# Patient Record
Sex: FEMALE | Race: WHITE | NOT HISPANIC OR LATINO | Employment: PART TIME | URBAN - METROPOLITAN AREA
[De-identification: names, ages, dates, MRNs, and addresses within clinical notes are randomized per-mention and may not be internally consistent; named-entity substitution may affect disease eponyms.]

---

## 2017-01-06 ENCOUNTER — OFFICE VISIT (OUTPATIENT)
Dept: OBSTETRICS AND GYNECOLOGY | Facility: CLINIC | Age: 22
End: 2017-01-06
Payer: COMMERCIAL

## 2017-01-06 VITALS
WEIGHT: 157 LBS | BODY MASS INDEX: 25.23 KG/M2 | DIASTOLIC BLOOD PRESSURE: 82 MMHG | SYSTOLIC BLOOD PRESSURE: 122 MMHG | HEIGHT: 66 IN

## 2017-01-06 DIAGNOSIS — Z30.41 ENCOUNTER FOR SURVEILLANCE OF CONTRACEPTIVE PILLS: ICD-10-CM

## 2017-01-06 DIAGNOSIS — Z01.419 ENCOUNTER FOR GYNECOLOGICAL EXAMINATION WITHOUT ABNORMAL FINDING: Primary | ICD-10-CM

## 2017-01-06 DIAGNOSIS — Z30.40 ENCOUNTER FOR SURVEILLANCE OF CONTRACEPTIVES: ICD-10-CM

## 2017-01-06 PROCEDURE — 99395 PREV VISIT EST AGE 18-39: CPT | Mod: S$GLB,,, | Performed by: NURSE PRACTITIONER

## 2017-01-06 PROCEDURE — 99999 PR PBB SHADOW E&M-EST. PATIENT-LVL II: CPT | Mod: PBBFAC,,, | Performed by: NURSE PRACTITIONER

## 2017-01-06 RX ORDER — NORETHINDRONE ACETATE AND ETHINYL ESTRADIOL 1.5-30(21)
1 KIT ORAL DAILY
Qty: 28 TABLET | Refills: 12 | Status: SHIPPED | OUTPATIENT
Start: 2017-01-06 | End: 2018-01-15

## 2017-01-06 NOTE — PROGRESS NOTES
"CC: Well woman exam    Evan Sousa is a 21 y.o. female  presents for well woman exam.  LMP: Patient's last menstrual period was 2016 (exact date)..  No issues, problems, or complaints.      History reviewed. No pertinent past medical history.  History reviewed. No pertinent past surgical history.  Social History     Social History    Marital status: Single     Spouse name: N/A    Number of children: N/A    Years of education: N/A     Occupational History    Not on file.     Social History Main Topics    Smoking status: Never Smoker    Smokeless tobacco: Not on file    Alcohol use 0.0 oz/week     0 Standard drinks or equivalent per week      Comment: socially    Drug use: No    Sexual activity: Yes     Partners: Male     Birth control/ protection: Condom, OCP     Other Topics Concern    Not on file     Social History Narrative     Family History   Problem Relation Age of Onset    Breast cancer Maternal Grandmother     Diabetes Paternal Grandfather     Stroke Paternal Grandfather     Ovarian cancer Neg Hx     Deep vein thrombosis Neg Hx     Cancer Neg Hx     Colon cancer Neg Hx     Eclampsia Neg Hx     Hypertension Neg Hx     Miscarriages / Stillbirths Neg Hx      labor Neg Hx      OB History      Para Term  AB TAB SAB Ectopic Multiple Living    0 0 0 0 0 0 0 0 0 0          Visit Vitals    /82    Ht 5' 6" (1.676 m)    Wt 71.2 kg (157 lb)    LMP 2016 (Exact Date)    BMI 25.34 kg/m2         ROS:  GENERAL: Denies weight gain or weight loss. Feeling well overall.   SKIN: Denies rash or lesions.   HEAD: Denies head injury or headache.   NODES: Denies enlarged lymph nodes.   CHEST: Denies chest pain or shortness of breath.   CARDIOVASCULAR: Denies palpitations or left sided chest pain.   ABDOMEN: No abdominal pain, constipation, diarrhea, nausea, vomiting or rectal bleeding.   URINARY: No frequency, dysuria, hematuria, or burning on " urination.  REPRODUCTIVE: See HPI.   BREASTS: The patient performs breast self-examination and denies pain, lumps, or nipple discharge.   HEMATOLOGIC: No easy bruisability or excessive bleeding.   MUSCULOSKELETAL: Denies joint pain or swelling.   NEUROLOGIC: Denies syncope or weakness.   PSYCHIATRIC: Denies depression, anxiety or mood swings.    PHYSICAL EXAM:  APPEARANCE: Well nourished, well developed, in no acute distress.  AFFECT: WNL, alert and oriented x 3  SKIN: No acne or hirsutism  NECK: Neck symmetric without masses or thyromegaly  NODES: No inguinal, cervical, axillary, or femoral lymph node enlargement  CHEST: Good respiratory effect  ABDOMEN: Soft.  No tenderness or masses.  No hepatosplenomegaly.  No hernias.  BREASTS: Symmetrical, no skin changes or visible lesions.  No palpable masses, nipple discharge bilaterally.  PELVIC: Normal external genitalia without lesions.  Normal hair distribution.  Adequate perineal body, normal urethral meatus.  Vagina moist and well rugated without lesions or discharge.  Cervix pink, without lesions, discharge or tenderness.  No significant cystocele or rectocele.  Bimanual exam shows uterus to be normal size, regular, mobile and nontender.  Adnexa without masses or tenderness.    EXTREMITIES: No edema.  Physical Exam    1. Encounter for gynecological examination without abnormal finding     2. Encounter for surveillance of contraceptive pills     3. Encounter for surveillance of contraceptives  norethindrone-ethinyl estradiol-iron (GILDESS FE 1.5/30, 28,) 1.5 mg-30 mcg (21)/75 mg (7) tablet    AND PLAN:    Patient was counseled today on A.C.S. Pap guidelines and recommendations for yearly pelvic exams, mammograms and monthly self breast exams; to see her PCP for other health maintenance.

## 2017-01-09 ENCOUNTER — OFFICE VISIT (OUTPATIENT)
Dept: INTERNAL MEDICINE | Facility: CLINIC | Age: 22
End: 2017-01-09
Payer: COMMERCIAL

## 2017-01-09 ENCOUNTER — LAB VISIT (OUTPATIENT)
Dept: LAB | Facility: HOSPITAL | Age: 22
End: 2017-01-09
Attending: NURSE PRACTITIONER
Payer: COMMERCIAL

## 2017-01-09 VITALS
DIASTOLIC BLOOD PRESSURE: 86 MMHG | HEART RATE: 59 BPM | BODY MASS INDEX: 25.19 KG/M2 | SYSTOLIC BLOOD PRESSURE: 118 MMHG | TEMPERATURE: 98 F | HEIGHT: 66 IN | OXYGEN SATURATION: 99 % | WEIGHT: 156.75 LBS

## 2017-01-09 DIAGNOSIS — F41.9 ANXIETY: ICD-10-CM

## 2017-01-09 DIAGNOSIS — Z00.00 PHYSICAL EXAM: Primary | ICD-10-CM

## 2017-01-09 DIAGNOSIS — F32.89 OTHER DEPRESSION: ICD-10-CM

## 2017-01-09 DIAGNOSIS — Z00.00 PHYSICAL EXAM: ICD-10-CM

## 2017-01-09 DIAGNOSIS — R53.83 FATIGUE, UNSPECIFIED TYPE: ICD-10-CM

## 2017-01-09 LAB
ANION GAP SERPL CALC-SCNC: 10 MMOL/L
BASOPHILS # BLD AUTO: 0.04 K/UL
BASOPHILS NFR BLD: 0.4 %
BUN SERPL-MCNC: 10 MG/DL
CALCIUM SERPL-MCNC: 9.6 MG/DL
CHLORIDE SERPL-SCNC: 104 MMOL/L
CO2 SERPL-SCNC: 23 MMOL/L
CREAT SERPL-MCNC: 0.7 MG/DL
DIFFERENTIAL METHOD: ABNORMAL
EOSINOPHIL # BLD AUTO: 0.3 K/UL
EOSINOPHIL NFR BLD: 3.3 %
ERYTHROCYTE [DISTWIDTH] IN BLOOD BY AUTOMATED COUNT: 12.4 %
EST. GFR  (AFRICAN AMERICAN): >60 ML/MIN/1.73 M^2
EST. GFR  (NON AFRICAN AMERICAN): >60 ML/MIN/1.73 M^2
GLUCOSE SERPL-MCNC: 81 MG/DL
HCT VFR BLD AUTO: 41.1 %
HGB BLD-MCNC: 14 G/DL
LYMPHOCYTES # BLD AUTO: 2.8 K/UL
LYMPHOCYTES NFR BLD: 30.3 %
MCH RBC QN AUTO: 29.5 PG
MCHC RBC AUTO-ENTMCNC: 34.1 %
MCV RBC AUTO: 87 FL
MONOCYTES # BLD AUTO: 0.6 K/UL
MONOCYTES NFR BLD: 6.8 %
NEUTROPHILS # BLD AUTO: 5.5 K/UL
NEUTROPHILS NFR BLD: 59 %
PLATELET # BLD AUTO: 387 K/UL
PMV BLD AUTO: 10.5 FL
POTASSIUM SERPL-SCNC: 4 MMOL/L
RBC # BLD AUTO: 4.75 M/UL
SODIUM SERPL-SCNC: 137 MMOL/L
TSH SERPL DL<=0.005 MIU/L-ACNC: 1.02 UIU/ML
WBC # BLD AUTO: 9.32 K/UL

## 2017-01-09 PROCEDURE — 84443 ASSAY THYROID STIM HORMONE: CPT

## 2017-01-09 PROCEDURE — 36415 COLL VENOUS BLD VENIPUNCTURE: CPT | Mod: PO

## 2017-01-09 PROCEDURE — 80048 BASIC METABOLIC PNL TOTAL CA: CPT

## 2017-01-09 PROCEDURE — 1159F MED LIST DOCD IN RCRD: CPT | Mod: S$GLB,,, | Performed by: NURSE PRACTITIONER

## 2017-01-09 PROCEDURE — 99395 PREV VISIT EST AGE 18-39: CPT | Mod: S$GLB,,, | Performed by: NURSE PRACTITIONER

## 2017-01-09 PROCEDURE — 99999 PR PBB SHADOW E&M-EST. PATIENT-LVL III: CPT | Mod: PBBFAC,,, | Performed by: NURSE PRACTITIONER

## 2017-01-09 PROCEDURE — 85025 COMPLETE CBC W/AUTO DIFF WBC: CPT

## 2017-01-09 NOTE — PROGRESS NOTES
"Subjective:   Patient ID: Evan Sousa is a 21 y.o. female.    Chief Complaint: Annual Exam    HPI Comments: Pt. Presents today for an routine physical exam as her therapist told her to come have some basic labs done. She has been seeing a therapist since March of 2016 for problems with anxiety and depression. States she had this "problem a few years ago" and then felt better. She reports not sure exactly why she feels this way as she has a good relationship with her significant other and has a good job. However, her friends don't live around here and has some family problems. Symptoms of low energy, fatigue, and insomnia. Reports that she cries at least once a week. No homicidal or suicidal ideation. No visual or auditory hallucinations. Has not taken any medications for depression/anxiety in the past. She feels like the therapist sessions help so she can talk to someone    Review of Systems   Constitutional: Negative for appetite change, fever and unexpected weight change.   Eyes: Negative for visual disturbance.   Respiratory: Negative for cough and shortness of breath.    Cardiovascular: Negative for chest pain and palpitations.   Gastrointestinal: Negative for abdominal pain, diarrhea, nausea and vomiting.   Genitourinary: Negative for difficulty urinating.   Neurological: Negative for dizziness, weakness and headaches.   Psychiatric/Behavioral: Positive for dysphoric mood and sleep disturbance. Negative for agitation, confusion, hallucinations, self-injury and suicidal ideas. The patient is nervous/anxious. The patient is not hyperactive.        Objective:      Physical Exam   Constitutional: She is oriented to person, place, and time. She appears well-developed and well-nourished. No distress.   HENT:   Head: Normocephalic and atraumatic.   Eyes: Lids are normal.   Neck: Neck supple. No thyromegaly present.   Cardiovascular: Normal rate, regular rhythm and normal heart sounds.    Pulmonary/Chest: " Effort normal and breath sounds normal.   Abdominal: Soft. Bowel sounds are normal. She exhibits no distension. There is no tenderness.   Musculoskeletal: Normal range of motion.   Lymphadenopathy:     She has no cervical adenopathy.   Neurological: She is alert and oriented to person, place, and time.   Skin: Skin is warm and dry. She is not diaphoretic.   Psychiatric: She has a normal mood and affect. Her speech is normal and behavior is normal. Judgment and thought content normal.   Nursing note and vitals reviewed.      Assessment:       1. Physical exam    2. Other depression    3. Anxiety    4. Fatigue, unspecified type        Plan:   Physical exam  Fatigue, unspecified type  -     CBC auto differential; Future; Expected date: 1/9/17  -     Basic metabolic panel; Future; Expected date: 7/8/17  -     TSH; Future; Expected date: 4/9/17    Other depression  Anxiety       - Continue care with the therapist.        -  Discussed starting medication - will await labs then f/u to establish care with an MD to discuss      Return in about 1 week (around 1/16/2017).

## 2017-01-09 NOTE — MR AVS SNAPSHOT
[facility]  [address] [place] [address]  Phone: [phone]  Fax: [phone]                  Evan Sousa   2017 3:20 PM   Office Visit    Description:  Female : 1995   Provider:  TAYLOR Castro   Department:  [facility]           Reason for Visit     Annual Exam           Diagnoses this Visit        Comments    Physical exam    -  Primary     Other depression         Anxiety         Fatigue, unspecified type                To Do List           Future Appointments        Provider Department Dept Phone    2017 3:50 PM LABORATORY, [facility] [phone]    2017 1:40 PM Haider Ochoa MD [facility] [phone]      Goals (5 Years of Data)     None      Follow-Up and Disposition     Return in about 1 week (around 2017).      [facility]     [facility] Nurse Care Line -  Assistance  Registered nurses in the [facility] Center provide clinical advisement, health education, appointment booking, and other advisory services.  Call for this free service at [phone].             Medications           Message regarding Medications     Verify the changes and/or additions to your medication regime listed below are the same as discussed with your clinician today.  If any of these changes or additions are incorrect, please notify your healthcare provider.             Verify that the below list of medications is an accurate representation of the medications you are currently taking.  If none reported, the list may be blank. If incorrect, please contact your healthcare provider. Carry this list with you in case of emergency.           Current Medications     norethindrone-ethinyl estradiol-iron (GILDESS FE 1.5/30, 28,) 1.5 mg-30 mcg (21)/75 mg (7) tablet Take 1 tablet by mouth once daily.           Clinical Reference Information           Vital Signs - Last Recorded  Most  "recent update: 1/9/2017  3:28 PM by Lauren Doyle MA    BP Pulse Temp Ht Wt LMP    118/86 (!) 59 98.3 °F (36.8 °C) (Tympanic) 5' 6" (1.676 m) 71.1 kg (156 lb 12 oz) 12/21/2016 (Exact Date)    SpO2 BMI             99% 25.3 kg/m2         Blood Pressure          Most Recent Value    BP  118/86      Allergies as of 1/9/2017     No Known Allergies      Immunizations Administered on Date of Encounter - 1/9/2017     None      Orders Placed During Today's Visit     Future Labs/Procedures Expected by Expires    CBC auto differential  1/9/2017 3/10/2018    TSH  4/9/2017 1/9/2018    Basic metabolic panel  7/8/2017 1/9/2018      "

## 2017-01-23 ENCOUNTER — OFFICE VISIT (OUTPATIENT)
Dept: INTERNAL MEDICINE | Facility: CLINIC | Age: 22
End: 2017-01-23
Payer: COMMERCIAL

## 2017-01-23 VITALS
DIASTOLIC BLOOD PRESSURE: 84 MMHG | HEIGHT: 66 IN | SYSTOLIC BLOOD PRESSURE: 118 MMHG | OXYGEN SATURATION: 99 % | BODY MASS INDEX: 25.05 KG/M2 | WEIGHT: 155.88 LBS | TEMPERATURE: 98 F | HEART RATE: 75 BPM

## 2017-01-23 DIAGNOSIS — F32.A DEPRESSION, UNSPECIFIED DEPRESSION TYPE: ICD-10-CM

## 2017-01-23 DIAGNOSIS — F41.9 ANXIETY: Primary | ICD-10-CM

## 2017-01-23 PROCEDURE — 99999 PR PBB SHADOW E&M-EST. PATIENT-LVL III: CPT | Mod: PBBFAC,,, | Performed by: INTERNAL MEDICINE

## 2017-01-23 PROCEDURE — 99213 OFFICE O/P EST LOW 20 MIN: CPT | Mod: S$GLB,,, | Performed by: INTERNAL MEDICINE

## 2017-01-23 PROCEDURE — 1159F MED LIST DOCD IN RCRD: CPT | Mod: S$GLB,,, | Performed by: INTERNAL MEDICINE

## 2017-01-23 RX ORDER — ESCITALOPRAM OXALATE 10 MG/1
10 TABLET ORAL DAILY
Qty: 30 TABLET | Refills: 1 | Status: SHIPPED | OUTPATIENT
Start: 2017-01-23 | End: 2017-07-27

## 2017-01-23 NOTE — MR AVS SNAPSHOT
[facility]  [address]  [place] [address]  Phone: [phone]  Fax: [phone]                  Evan Sousa   2017 3:20 PM   Office Visit    Description:  Female : 1995   Provider:  Haider Ochoa MD   Department:  [facility]           Reason for Visit     Establish Care           Diagnoses this Visit        Comments    Anxiety    -  Primary     Depression, unspecified depression type                To Do List           Future Appointments        Provider Department Dept Phone    2017 3:20 PM Haider Ochoa MD [facility] [phone]      Goals (5 Years of Data)     None      Follow-Up and Disposition     Return in about 4 weeks (around 2017), or if symptoms worsen or fail to improve.       These Medications        Disp Refills Start End    escitalopram oxalate (LEXAPRO) 10 MG tablet 30 tablet 1 2017    Take 1 tablet (10 mg total) by mouth once daily. - Oral    Pharmacy: [facility]/pharmacy #[id number] - [address] #: [phone]         [facility] On Call     [facility] On Call Nurse Care Line -  Assistance  Registered nurses in the [facility] On Call Center provide clinical advisement, health education, appointment booking, and other advisory services.  Call for this free service at [phone].             Medications           Message regarding Medications     Verify the changes and/or additions to your medication regime listed below are the same as discussed with your clinician today.  If any of these changes or additions are incorrect, please notify your healthcare provider.        START taking these NEW medications        Refills    escitalopram oxalate (LEXAPRO) 10 MG tablet 1    Sig: Take 1 tablet (10 mg total) by mouth once daily.    Class: Normal    Route: Oral           Verify that the below list of medications is an accurate representation of the  "medications you are currently taking.  If none reported, the list may be blank. If incorrect, please contact your healthcare provider. Carry this list with you in case of emergency.           Current Medications     norethindrone-ethinyl estradiol-iron (GILDESS FE 1.5/30, 28,) 1.5 mg-30 mcg (21)/75 mg (7) tablet Take 1 tablet by mouth once daily.    escitalopram oxalate (LEXAPRO) 10 MG tablet Take 1 tablet (10 mg total) by mouth once daily.           Clinical Reference Information           Vital Signs - Last Recorded  Most recent update: 1/23/2017  3:46 PM by Lindsay Cobos MA    BP Pulse Temp Ht Wt LMP    118/84 (BP Location: Right arm, Patient Position: Sitting) 75 98.4 °F (36.9 °C) (Tympanic) 5' 6" (1.676 m) 70.7 kg (155 lb 13.8 oz) 01/18/2017 (Exact Date)    SpO2 BMI             99% 25.16 kg/m2         Blood Pressure          Most Recent Value    BP  118/84      Allergies as of 1/23/2017     No Known Allergies      Immunizations Administered on Date of Encounter - 1/23/2017     None      "

## 2017-01-23 NOTE — LETTER
January 28, 2017      Alexander Tay MD  [address] [place] [address]           [facility]  [address]  [place] [address]  Phone: [phone]  Fax: [phone]          Patient: Evan Sousa   MR Number: [id number]   YOB: 1995   Date of Visit: 1/23/2017       Dear Dr. Alexander Tay:    Thank you for referring Evan Sousa to me for evaluation. Attached you will find relevant portions of my assessment and plan of care.    If you have questions, please do not hesitate to call me. I look forward to following Evan Sousa along with you.    Sincerely,    Haider Ochoa MD    Enclosure  CC:  No Recipients    If you would like to receive this communication electronically, please contact [email or web address] or [phone] to request more information on [employer] Link access.    For providers and/or their staff who would like to refer a patient to [facility], please contact us through our one-stop-shop provider referral line, [facility] [place], at [phone].    If you feel you have received this communication in error or would no longer like to receive these types of communications, please e-mail [email or web address]

## 2017-01-23 NOTE — PROGRESS NOTES
"Subjective:      Patient ID: Evan Sousa is a 21 y.o. female.    Chief Complaint: Establish Care    HPI Comments:  She has been seeing a therapist since March of 2016 for problems with anxiety and depression. States she had this "problem a few years ago" and then felt better. She reports not sure exactly why she feels sad and anxious.  However, her friends don't live around here and has some family problems. Symptoms of low energy, fatigue, and insomnia, decreased volition, anhedonia.  Reports that she cries at least once a week. No homicidal or suicidal ideation. No visual or auditory hallucinations. Has not taken any medications for depression/anxiety in the past. She feels like the therapist sessions helped but interested in meds.     Review of Systems   Constitutional: Negative for chills and fever.   Respiratory: Negative for cough.    Cardiovascular: Negative for chest pain.   Gastrointestinal: Negative for abdominal pain.   Psychiatric/Behavioral: Positive for dysphoric mood. Negative for hallucinations and suicidal ideas. The patient is nervous/anxious.      Objective:     Visit Vitals    /84 (BP Location: Right arm, Patient Position: Sitting)    Pulse 75    Temp 98.4 °F (36.9 °C) (Tympanic)    Ht 5' 6" (1.676 m)    Wt 70.7 kg (155 lb 13.8 oz)    LMP 01/18/2017 (Exact Date)    SpO2 99%    BMI 25.16 kg/m2       Physical Exam   Constitutional: She appears well-developed and well-nourished. No distress.   Cardiovascular: Normal rate.    Pulmonary/Chest: Effort normal.   Neurological: She is alert.   Skin: Skin is warm and dry.   Psychiatric: She has a normal mood and affect. Her behavior is normal.       Assessment:     1. Anxiety    2. Depression, unspecified depression type      Plan:   Anxiety  -     escitalopram oxalate (LEXAPRO) 10 MG tablet; Take 1 tablet (10 mg total) by mouth once daily.  Dispense: 30 tablet; Refill: 1    Depression, unspecified depression type  -     escitalopram " oxalate (LEXAPRO) 10 MG tablet; Take 1 tablet (10 mg total) by mouth once daily.  Dispense: 30 tablet; Refill: 1              Return in about 4 weeks (around 2/20/2017), or if symptoms worsen or fail to improve.

## 2017-07-27 ENCOUNTER — OFFICE VISIT (OUTPATIENT)
Dept: INTERNAL MEDICINE | Facility: CLINIC | Age: 22
End: 2017-07-27
Payer: COMMERCIAL

## 2017-07-27 VITALS
DIASTOLIC BLOOD PRESSURE: 80 MMHG | SYSTOLIC BLOOD PRESSURE: 118 MMHG | WEIGHT: 156.75 LBS | OXYGEN SATURATION: 99 % | HEIGHT: 66 IN | TEMPERATURE: 98 F | BODY MASS INDEX: 25.19 KG/M2 | HEART RATE: 83 BPM

## 2017-07-27 DIAGNOSIS — Z00.00 ROUTINE GENERAL MEDICAL EXAMINATION AT A HEALTH CARE FACILITY: Primary | ICD-10-CM

## 2017-07-27 DIAGNOSIS — Z02.0 SCHOOL PHYSICAL EXAM: ICD-10-CM

## 2017-07-27 PROCEDURE — 99999 PR PBB SHADOW E&M-EST. PATIENT-LVL III: CPT | Mod: PBBFAC,,, | Performed by: INTERNAL MEDICINE

## 2017-07-27 PROCEDURE — 99395 PREV VISIT EST AGE 18-39: CPT | Mod: S$GLB,,, | Performed by: INTERNAL MEDICINE

## 2017-07-27 NOTE — PROGRESS NOTES
"Subjective:      Patient ID: Evan Sousa is a 22 y.o. female.    Chief Complaint: Annual Exam (school forms completed)    23 yo with Patient Active Problem List:     Near syncope     Reactive hypoglycemia    History reviewed. No pertinent past medical history.    Here today for annual exam. Needs school forms completed for school. Non smoker. No first degree rel with breast or colon cancer.      Review of Systems   Constitutional: Negative for activity change and unexpected weight change.   HENT: Negative for hearing loss, rhinorrhea and trouble swallowing.    Eyes: Negative for discharge and visual disturbance.   Respiratory: Negative for chest tightness and wheezing.    Cardiovascular: Negative for chest pain and palpitations.   Gastrointestinal: Negative for blood in stool, constipation, diarrhea and vomiting.   Endocrine: Negative for polydipsia and polyuria.   Genitourinary: Negative for difficulty urinating, dysuria, hematuria and menstrual problem.   Musculoskeletal: Negative for arthralgias, joint swelling and neck pain.   Neurological: Negative for weakness and headaches.   Psychiatric/Behavioral: Negative for confusion and dysphoric mood.     Objective:   /80 (BP Location: Right arm, Patient Position: Sitting)   Pulse 83   Temp 98 °F (36.7 °C) (Tympanic)   Ht 5' 6" (1.676 m)   Wt 71.1 kg (156 lb 12 oz)   LMP 06/27/2017 (Approximate)   SpO2 99%   BMI 25.30 kg/m²     Physical Exam   Constitutional: She is oriented to person, place, and time. She appears well-developed and well-nourished. No distress.   HENT:   Head: Normocephalic and atraumatic.   Mouth/Throat: Oropharynx is clear and moist.   Eyes: EOM are normal. Pupils are equal, round, and reactive to light.   Neck: Neck supple. No thyromegaly present.   Cardiovascular: Normal rate and regular rhythm.    Pulmonary/Chest: Breath sounds normal. She has no wheezes. She has no rales.   Abdominal: Soft. Bowel sounds are normal. There is " no tenderness.   Musculoskeletal: Normal range of motion. She exhibits no edema or tenderness.        Cervical back: She exhibits normal range of motion, no tenderness, no bony tenderness and no swelling.        Lumbar back: She exhibits normal range of motion, no tenderness, no bony tenderness and no swelling.   Lymphadenopathy:     She has no cervical adenopathy.   Neurological: She is alert and oriented to person, place, and time. She has normal reflexes.   Skin: Skin is warm and dry. No rash noted.   Psychiatric: She has a normal mood and affect. Her behavior is normal.       Assessment:     1. Routine general medical examination at a health care facility    2. School physical exam      Plan:   Routine general medical examination at a health care facility  -     Lipid panel; Future; Expected date: 07/27/2017    School physical exam  -     Mumps, IgG Screen; Future; Expected date: 07/27/2017  -     Rubeola antibody IgG; Future; Expected date: 07/27/2017  -     RUBELLA ANTIBODY, IGG; Future; Expected date: 07/27/2017  -     HEPATITIS B SURFACE ANTIBODY; Future; Expected date: 07/27/2017  -     Varicella zoster antibody, IgG; Future; Expected date: 07/27/2017  -     POCT TB Skin Test Read    Heart healthy diet and reg exercise  HM reviewed with pt          Return if symptoms worsen or fail to improve.

## 2017-07-28 ENCOUNTER — CLINICAL SUPPORT (OUTPATIENT)
Dept: INTERNAL MEDICINE | Facility: CLINIC | Age: 22
End: 2017-07-28
Payer: COMMERCIAL

## 2017-07-28 ENCOUNTER — LAB VISIT (OUTPATIENT)
Dept: LAB | Facility: HOSPITAL | Age: 22
End: 2017-07-28
Attending: INTERNAL MEDICINE
Payer: COMMERCIAL

## 2017-07-28 DIAGNOSIS — Z00.00 ROUTINE GENERAL MEDICAL EXAMINATION AT A HEALTH CARE FACILITY: ICD-10-CM

## 2017-07-28 DIAGNOSIS — Z02.0 SCHOOL PHYSICAL EXAM: ICD-10-CM

## 2017-07-28 LAB
CHOLEST/HDLC SERPL: 4.2 {RATIO}
HDL/CHOLESTEROL RATIO: 23.9 %
HDLC SERPL-MCNC: 234 MG/DL
HDLC SERPL-MCNC: 56 MG/DL
LDLC SERPL CALC-MCNC: 138.6 MG/DL
NONHDLC SERPL-MCNC: 178 MG/DL
TRIGL SERPL-MCNC: 197 MG/DL

## 2017-07-28 PROCEDURE — 80061 LIPID PANEL: CPT

## 2017-07-28 PROCEDURE — 86735 MUMPS ANTIBODY: CPT

## 2017-07-28 PROCEDURE — 86580 TB INTRADERMAL TEST: CPT | Mod: S$GLB,,, | Performed by: INTERNAL MEDICINE

## 2017-07-28 PROCEDURE — 86765 RUBEOLA ANTIBODY: CPT

## 2017-07-28 PROCEDURE — 36415 COLL VENOUS BLD VENIPUNCTURE: CPT | Mod: PO

## 2017-07-28 PROCEDURE — 86706 HEP B SURFACE ANTIBODY: CPT

## 2017-07-28 PROCEDURE — 86762 RUBELLA ANTIBODY: CPT

## 2017-07-28 PROCEDURE — 86787 VARICELLA-ZOSTER ANTIBODY: CPT

## 2017-07-28 NOTE — PROGRESS NOTES
Patient here for PPD placement.  After verifying patient's allergies and medications, patient received tuberculin 0.1 mL to left forearm.  Instructed patient to return to clinic on 7/31/17 before 9:15 am for PPD reading.  Patient verbalized understanding.

## 2017-07-31 ENCOUNTER — CLINICAL SUPPORT (OUTPATIENT)
Dept: INTERNAL MEDICINE | Facility: CLINIC | Age: 22
End: 2017-07-31
Payer: COMMERCIAL

## 2017-07-31 LAB
HBV SURFACE AB SER-ACNC: NEGATIVE M[IU]/ML
MUMPS IGG INTERPRETATION: POSITIVE
MUMPS IGG SCREEN: 1.88 ISR
RUBEOLA IGG ANTIBODY: 2.24 ISR
RUBEOLA INTERPRETATION: POSITIVE
RUBV IGG SER-ACNC: 18.2 IU/ML
RUBV IGG SER-IMP: REACTIVE
TB INDURATION - 48 HR READ: NORMAL MM
TB INDURATION - 72 HR READ: 0 MM
TB SKIN TEST - 48 HR READ: NORMAL
TB SKIN TEST - 72 HR READ: NEGATIVE
VARICELLA INTERPRETATION: POSITIVE
VARICELLA ZOSTER IGG: 1.83 ISR

## 2017-07-31 NOTE — PROGRESS NOTES
Patient here for PPD reading. PPD negative with 0mm in diameter. Pt verbalized understanding. Nurse Antonia entered results in Links.

## 2017-08-01 ENCOUNTER — TELEPHONE (OUTPATIENT)
Dept: INTERNAL MEDICINE | Facility: CLINIC | Age: 22
End: 2017-08-01

## 2017-08-01 NOTE — TELEPHONE ENCOUNTER
----- Message from Daniel Lozano sent at 8/1/2017 10:20 AM CDT -----  Pt is returning a call to nurse in regards to lab results.          Please call pt back at [phone]

## 2017-09-01 ENCOUNTER — PATIENT MESSAGE (OUTPATIENT)
Dept: INTERNAL MEDICINE | Facility: CLINIC | Age: 22
End: 2017-09-01

## 2018-01-15 ENCOUNTER — OFFICE VISIT (OUTPATIENT)
Dept: OBSTETRICS AND GYNECOLOGY | Facility: CLINIC | Age: 23
End: 2018-01-15
Payer: COMMERCIAL

## 2018-01-15 VITALS
BODY MASS INDEX: 27.17 KG/M2 | SYSTOLIC BLOOD PRESSURE: 104 MMHG | HEIGHT: 66 IN | DIASTOLIC BLOOD PRESSURE: 66 MMHG | WEIGHT: 169.06 LBS

## 2018-01-15 DIAGNOSIS — N94.6 DYSMENORRHEA: ICD-10-CM

## 2018-01-15 DIAGNOSIS — Z01.419 WELL WOMAN EXAM WITH ROUTINE GYNECOLOGICAL EXAM: Primary | ICD-10-CM

## 2018-01-15 DIAGNOSIS — Z30.41 ENCOUNTER FOR SURVEILLANCE OF CONTRACEPTIVE PILLS: ICD-10-CM

## 2018-01-15 PROCEDURE — 99395 PREV VISIT EST AGE 18-39: CPT | Mod: S$GLB,,, | Performed by: NURSE PRACTITIONER

## 2018-01-15 PROCEDURE — 99999 PR PBB SHADOW E&M-EST. PATIENT-LVL III: CPT | Mod: PBBFAC,,, | Performed by: NURSE PRACTITIONER

## 2018-01-15 PROCEDURE — 88175 CYTOPATH C/V AUTO FLUID REDO: CPT

## 2018-01-15 RX ORDER — NAPROXEN SODIUM 550 MG/1
550 TABLET ORAL 2 TIMES DAILY WITH MEALS
Qty: 30 TABLET | Refills: 3 | Status: SHIPPED | OUTPATIENT
Start: 2018-01-15 | End: 2018-01-22

## 2018-01-15 RX ORDER — LEVONORGESTREL AND ETHINYL ESTRADIOL 0.15-0.03
1 KIT ORAL DAILY
Qty: 90 TABLET | Refills: 4 | Status: SHIPPED | OUTPATIENT
Start: 2018-01-15 | End: 2019-01-15

## 2018-01-15 NOTE — PROGRESS NOTES
HISTORY OF PRESENT ILLNESS:    Evan Sousa is a 22 y.o. female, , Patient's last menstrual period was 2017 (exact date).,  presents for a routine exam and OCP refills.   -Having cramping the first two days of her sugar pills and would like to change to the back to back pills.   -MGM  from breast cancer age 68 and did not get the genetic testing, however her sister is planning to get tested.     PAST HISTORY:  History reviewed. No pertinent past medical history.  History reviewed. No pertinent surgical history.    MEDICATIONS AND ALLERGIES:  Gilbert LÓPEZ    Review of patient's allergies indicates:  No Known Allergies    Family History   Problem Relation Age of Onset    Breast cancer Maternal Grandmother 70     Dec'd    Diabetes Paternal Grandfather     Stroke Paternal Grandfather     Ovarian cancer Neg Hx     Colon cancer Neg Hx        Social History     Social History    Marital status: Single     Spouse name: N/A    Number of children: N/A    Years of education: N/A     Occupational History    Not on file.     Social History Main Topics    Smoking status: Never Smoker    Smokeless tobacco: Never Used    Alcohol use 0.0 oz/week      Comment: socially    Drug use: No    Sexual activity: Yes     Partners: Male     Birth control/ protection: Condom, OCP     Other Topics Concern    Not on file     Social History Narrative    No narrative on file       OB HISTORY: None.     COMPREHENSIVE GYN HISTORY:  PAP History: Denies abnormal Paps. LAST PAP 8-14-15 NORMAL.  Infection History: Denies STDs. Denies PID.  Benign History: Denies uterine fibroids. Denies ovarian cysts. Denies endometriosis. Denies other conditions.  Cancer History: Denies cervical cancer. Denies uterine cancer or hyperplasia. Denies ovarian cancer. Denies vulvar cancer or pre-cancer. Denies vaginal cancer or pre-cancer. Denies breast cancer. Denies colon cancer.  Sexual Activity History: Reports currently being  "sexually active  Menstrual History: Monthly. Mod then light flow.   Dysmenorrhea History: Reports moderate dysmenorrhea.   Contraception: OCPs.    ROS:  GENERAL: No weight changes. No swelling. No fatigue. No fever.  CARDIOVASCULAR: No chest pain. No shortness of breath. No leg cramps.   NEUROLOGICAL: No headaches. No vision changes.  BREASTS: No pain. No lumps. No discharge.  ABDOMEN: + DYSMENORRHEA. No nausea. No vomiting. No diarrhea. No constipation.  REPRODUCTIVE: No abnormal bleeding.   VULVA: No pain. No lesions. No itching.  VAGINA: No relaxation. No itching. No odor. No discharge. No lesions.  URINARY: No incontinence. No nocturia. No frequency. No dysuria.    /66   Ht 5' 6" (1.676 m)   Wt 76.7 kg (169 lb 1.5 oz)   LMP 12/24/2017 (Exact Date)   BMI 27.29 kg/m²     PE:  APPEARANCE: Well nourished, well developed, in no acute distress.  AFFECT: WNL, alert and oriented x 3.  SKIN: No acne or hirsutism.  NECK: Neck symmetric, without masses or thyromegaly.  NODES: No inguinal, cervical, axillary or femoral lymph node enlargement.  CHEST: Good respiratory effort.   ABDOMEN: Soft. No tenderness or masses. No hepatosplenomegaly. No hernias.  BREASTS: Symmetrical, no skin changes, visible lesions, palpable masses or nipple discharge bilaterally.  PELVIC: External female genitalia without lesions.  Female hair distribution. Adequate perineal body, Normal urethral meatus. Vagina moist and well rugated without lesions or discharge.  No significant cystocele or rectocele present. Cervix pink without lesions, discharge or tenderness. Uterus is 4-6 week size, regular, mobile and nontender. Adnexa without masses or tenderness.  EXTREMITIES: No edema    DIAGNOSIS:  1. Well woman exam with routine gynecological exam    2. Encounter for surveillance of contraceptive pills    3. Dysmenorrhea        PLAN:    Orders Placed This Encounter    Liquid-based pap smear, screening    levonorgestrel-ethinyl estradiol " (SEASONALE) 0.15 mg-30 mcg per tablet    naproxen sodium (ANAPROX DS) 550 MG tablet   Declined STD testing    COUNSELING:  The patient was counseled today on:  -Back to back OCP use and potential side effects;  -NSAIDs use and potential side effects;  -STDs and prevention including the Gardasil vaccine (has completed 3/3);  -A.C.S. Pap and pelvic exam guidelines (pap every 3 years);  -to follow up with her PCP for other health maintenance.    FOLLOW-UP with me annually.

## 2018-03-19 ENCOUNTER — PATIENT MESSAGE (OUTPATIENT)
Dept: OBSTETRICS AND GYNECOLOGY | Facility: CLINIC | Age: 23
End: 2018-03-19

## 2019-02-14 ENCOUNTER — OFFICE VISIT (OUTPATIENT)
Dept: OBSTETRICS AND GYNECOLOGY | Facility: CLINIC | Age: 24
End: 2019-02-14
Payer: COMMERCIAL

## 2019-02-14 VITALS
WEIGHT: 170.63 LBS | BODY MASS INDEX: 26.78 KG/M2 | SYSTOLIC BLOOD PRESSURE: 100 MMHG | HEIGHT: 67 IN | DIASTOLIC BLOOD PRESSURE: 68 MMHG

## 2019-02-14 DIAGNOSIS — Z01.419 WELL WOMAN EXAM WITH ROUTINE GYNECOLOGICAL EXAM: Primary | ICD-10-CM

## 2019-02-14 PROCEDURE — 99395 PREV VISIT EST AGE 18-39: CPT | Mod: S$GLB,,, | Performed by: OBSTETRICS & GYNECOLOGY

## 2019-02-14 PROCEDURE — 99999 PR PBB SHADOW E&M-EST. PATIENT-LVL III: ICD-10-PCS | Mod: PBBFAC,,, | Performed by: OBSTETRICS & GYNECOLOGY

## 2019-02-14 PROCEDURE — 99395 PR PREVENTIVE VISIT,EST,18-39: ICD-10-PCS | Mod: S$GLB,,, | Performed by: OBSTETRICS & GYNECOLOGY

## 2019-02-14 PROCEDURE — 99999 PR PBB SHADOW E&M-EST. PATIENT-LVL III: CPT | Mod: PBBFAC,,, | Performed by: OBSTETRICS & GYNECOLOGY

## 2019-02-14 RX ORDER — LEVONORGESTREL AND ETHINYL ESTRADIOL 0.15-0.03
1 KIT ORAL DAILY
Qty: 90 TABLET | Refills: 3 | Status: SHIPPED | OUTPATIENT
Start: 2019-02-14 | End: 2020-02-14

## 2019-02-14 NOTE — PROGRESS NOTES
SUBJECTIVE:     Chief Complaint: Annual Exam       History of Present Illness:  Annual Exam  Patient presents for annual exam.   She c/o nothing today.  LMP: 19  She denies any vd, vb, dyspareunia, dysuria, depression, anxiety.  Last pap was in 2018 and was neg.  Birth Control: seasonale, wants to continue    GYN screening history: denies abnl pap  Mammogram history: na  Colonoscopy history: na  Dexa history: na    FH:   Breast cancer: maternal grandmother  Colon cancer: none  Ovarian cancer: none    Review of patient's allergies indicates:  No Known Allergies    History reviewed. No pertinent past medical history.  History reviewed. No pertinent surgical history.  OB History      Para Term  AB Living    0 0 0 0 0 0    SAB TAB Ectopic Multiple Live Births    0 0 0 0          Family History   Problem Relation Age of Onset    Breast cancer Maternal Grandmother 70        Dec'd    Diabetes Paternal Grandfather     Stroke Paternal Grandfather     Ovarian cancer Neg Hx     Colon cancer Neg Hx      Social History     Tobacco Use    Smoking status: Never Smoker    Smokeless tobacco: Never Used   Substance Use Topics    Alcohol use: Yes     Alcohol/week: 0.0 oz     Comment: socially    Drug use: No       Current Outpatient Medications   Medication Sig    levonorgestrel-ethinyl estradiol (SEASONALE) 0.15 mg-30 mcg per tablet Take 1 tablet by mouth once daily.     No current facility-administered medications for this visit.        Review of Systems:  GENERAL: No fever, chills, fatigability or weight loss.  CARDIOVASCULAR: No chest pain. No palpitations.  RESPIRATORY: No SOB, no wheezing.  BREAST: Denies pain. No lumps. No discharge.  VULVAR: No pain, no lesions and no itching.  VAGINAL: No relaxation, no itching, no discharge, no abnormal bleeding and no lesions.  ABDOMEN: No abdominal pain. Denies nausea. Denies vomiting. No diarrhea. No constipation  URINARY: No incontinence, no nocturia, no  frequency and no dysuria.  NEUROLOGICAL: No headaches. No vision changes.       OBJECTIVE:     Vitals:    02/14/19 0812   BP: 100/68       Physical Exam:  Gen: NAD, well developed, well-nourished  HEENT: Normocephalic, atraumatic  Eyes: EOM nl, conjuntivae normal  Neck: ROM normal, no thyromegaly  Respiratory: Effort normal  Abd: soft, nontender, no masses palpated  Breast: Normal bilaterally, no masses, lesions or tenderness. No nipple discharge on expression, no lymphadenopathy bilaterally.  SSE:  Vulva: no lesions or rashes  Cervix: No lesions noted, nonfriable, no vaginal discharge or vaginal bleeding noted  BME:   Cervix: No CMT  Adnexa: nl bilaterally, no masses or fullness palpated  Uterus: normal, nonenlarged  Musculoskeletal: normal ROM  Neuro: alert, AAOx3  Skin: warm and dry  Psych: mood/affect nl, behavior normal, judgement normal, thought content normal      ASSESSMENT:       ICD-10-CM ICD-9-CM    1. Well woman exam with routine gynecological exam Z01.419 V72.31           Plan:      Evan was seen today for annual exam.    Diagnoses and all orders for this visit:    Well woman exam with routine gynecological exam    Other orders  -     levonorgestrel-ethinyl estradiol (SEASONALE) 0.15 mg-30 mcg per tablet; Take 1 tablet by mouth once daily.        No orders of the defined types were placed in this encounter.      Follow up in one year for annual, or prn.    Julie R Jeansonne

## 2020-02-07 ENCOUNTER — OFFICE VISIT (OUTPATIENT)
Dept: OBSTETRICS AND GYNECOLOGY | Facility: CLINIC | Age: 25
End: 2020-02-07
Payer: COMMERCIAL

## 2020-02-07 VITALS
SYSTOLIC BLOOD PRESSURE: 116 MMHG | BODY MASS INDEX: 29.03 KG/M2 | WEIGHT: 184.94 LBS | HEIGHT: 67 IN | DIASTOLIC BLOOD PRESSURE: 88 MMHG

## 2020-02-07 DIAGNOSIS — Z01.419 WELL WOMAN EXAM WITH ROUTINE GYNECOLOGICAL EXAM: Primary | ICD-10-CM

## 2020-02-07 PROCEDURE — 99395 PR PREVENTIVE VISIT,EST,18-39: ICD-10-PCS | Mod: S$GLB,,, | Performed by: OBSTETRICS & GYNECOLOGY

## 2020-02-07 PROCEDURE — 99395 PREV VISIT EST AGE 18-39: CPT | Mod: S$GLB,,, | Performed by: OBSTETRICS & GYNECOLOGY

## 2020-02-07 RX ORDER — LEVONORGESTREL AND ETHINYL ESTRADIOL 0.15-0.03
1 KIT ORAL DAILY
Qty: 90 TABLET | Refills: 3 | Status: SHIPPED | OUTPATIENT
Start: 2020-02-07 | End: 2021-02-06

## 2020-02-07 NOTE — PROGRESS NOTES
SUBJECTIVE:     Chief Complaint: Well Woman       History of Present Illness:  Annual Exam  Patient presents for annual exam.   She c/o nothing today.  LMP: unsure  She denies any vd, vb, dyspareunia, dysuria, depression, anxiety.  Last pap was in 2018 and was neg.  [facility]    GYN screening history: neg  Mammogram history: na  Colonoscopy history: na  Dexa history: na    FH:   Breast cancer: maternal grandmother  Colon cancer: none  Ovarian cancer: none    Review of patient's allergies indicates:  No Known Allergies    No past medical history on file.  No past surgical history on file.  OB History        0    Para   0    Term   0       0    AB   0    Living   0       SAB   0    TAB   0    Ectopic   0    Multiple   0    Live Births                   Family History   Problem Relation Age of Onset    Breast cancer Maternal Grandmother 70        Dec'd    Diabetes Paternal Grandfather     Stroke Paternal Grandfather     Ovarian cancer Neg Hx     Colon cancer Neg Hx      Social History     Tobacco Use    Smoking status: Never Smoker    Smokeless tobacco: Never Used   Substance Use Topics    Alcohol use: Yes     Alcohol/week: 0.0 standard drinks     Comment: socially    Drug use: No       Current Outpatient Medications   Medication Sig    levonorgestrel-ethinyl estradiol (SEASONALE) 0.15 mg-30 mcg per tablet Take 1 tablet by mouth once daily.    multivitamin capsule Take 1 capsule by mouth once daily.    levonorgestrel-ethinyl estradiol (SEASONALE) 0.15 mg-30 mcg (91) per tablet Take 1 tablet by mouth once daily.     No current facility-administered medications for this visit.        Review of Systems:  GENERAL: No fever, chills, fatigability or weight loss.  CARDIOVASCULAR: No chest pain. No palpitations.  RESPIRATORY: No SOB, no wheezing.  BREAST: Denies pain. No lumps. No discharge.  VULVAR: No pain, no lesions and no itching.  VAGINAL: No relaxation, no itching, no  discharge, no abnormal bleeding and no lesions.  ABDOMEN: No abdominal pain. Denies nausea. Denies vomiting. No diarrhea. No constipation  URINARY: No incontinence, no nocturia, no frequency and no dysuria.  NEUROLOGICAL: No headaches. No vision changes.       OBJECTIVE:     Vitals:    02/07/20 1314   BP: 116/88       Physical Exam:  Gen: NAD, well developed, well-nourished  HEENT: Normocephalic, atraumatic  Eyes: EOM nl, conjuntivae normal  Neck: ROM normal, no thyromegaly  Respiratory: Effort normal   Abd: soft, nontender, no masses palpated  Breast: Normal bilaterally, no masses, lesions or tenderness. No nipple discharge on expression, no lymphadenopathy bilaterally.  SSE:  Vulva: no lesions or rashes  Cervix: No lesions noted, nonfriable, no vaginal discharge or vaginal bleeding noted  BME:   Cervix: No CMT  Adnexa: nl bilaterally, no masses or fullness palpated  Uterus: normal, nonenlarged  Musculoskeletal: normal ROM  Neuro: alert, AAOx3  Skin: warm and dry  Psych: mood/affect nl, behavior normal, judgement normal, thought content normal        ASSESSMENT:       ICD-10-CM ICD-9-CM    1. Well woman exam with routine gynecological exam Z01.419 V72.31           Plan:      Evan was seen today for well woman.    Diagnoses and all orders for this visit:    Well woman exam with routine gynecological exam    Other orders  -     levonorgestrel-ethinyl estradiol (SEASONALE) 0.15 mg-30 mcg (91) per tablet; Take 1 tablet by mouth once daily.        No orders of the defined types were placed in this encounter.      Follow up in one year for annual, or prn.    Julie R Jeansonne

## 2020-04-03 ENCOUNTER — TELEPHONE (OUTPATIENT)
Dept: INTERNAL MEDICINE | Facility: CLINIC | Age: 25
End: 2020-04-03

## 2021-02-26 ENCOUNTER — IMMUNIZATION (OUTPATIENT)
Dept: PHARMACY | Facility: CLINIC | Age: 26
End: 2021-02-26
Payer: COMMERCIAL

## 2021-02-26 DIAGNOSIS — Z23 NEED FOR VACCINATION: Primary | ICD-10-CM

## 2021-03-10 ENCOUNTER — OFFICE VISIT (OUTPATIENT)
Dept: OBSTETRICS AND GYNECOLOGY | Facility: CLINIC | Age: 26
End: 2021-03-10
Payer: COMMERCIAL

## 2021-03-10 VITALS
WEIGHT: 180.31 LBS | HEIGHT: 67 IN | BODY MASS INDEX: 28.3 KG/M2 | SYSTOLIC BLOOD PRESSURE: 100 MMHG | DIASTOLIC BLOOD PRESSURE: 76 MMHG

## 2021-03-10 DIAGNOSIS — Z01.419 ENCOUNTER FOR ANNUAL ROUTINE GYNECOLOGICAL EXAMINATION: Primary | ICD-10-CM

## 2021-03-10 PROCEDURE — 99999 PR PBB SHADOW E&M-EST. PATIENT-LVL III: ICD-10-PCS | Mod: PBBFAC,,, | Performed by: OBSTETRICS & GYNECOLOGY

## 2021-03-10 PROCEDURE — 99395 PREV VISIT EST AGE 18-39: CPT | Mod: S$GLB,,, | Performed by: OBSTETRICS & GYNECOLOGY

## 2021-03-10 PROCEDURE — 88175 CYTOPATH C/V AUTO FLUID REDO: CPT | Performed by: OBSTETRICS & GYNECOLOGY

## 2021-03-10 PROCEDURE — 99999 PR PBB SHADOW E&M-EST. PATIENT-LVL III: CPT | Mod: PBBFAC,,, | Performed by: OBSTETRICS & GYNECOLOGY

## 2021-03-10 PROCEDURE — 99395 PR PREVENTIVE VISIT,EST,18-39: ICD-10-PCS | Mod: S$GLB,,, | Performed by: OBSTETRICS & GYNECOLOGY

## 2021-03-10 RX ORDER — LEVONORGESTREL AND ETHINYL ESTRADIOL 0.15-0.03
1 KIT ORAL DAILY
Qty: 84 TABLET | Refills: 3 | Status: SHIPPED | OUTPATIENT
Start: 2021-03-10 | End: 2021-03-14

## 2021-03-17 LAB
CLINICAL INFO: NORMAL
CYTO CVX: NORMAL
CYTOLOGIST CVX/VAG CYTO: NORMAL
CYTOLOGY CMNT CVX/VAG CYTO-IMP: NORMAL
CYTOLOGY PAP THIN PREP EXPLANATION: NORMAL
DATE OF PREVIOUS PAP: NORMAL
DATE PREVIOUS BX: NO
LMP START DATE: NORMAL
SPECIMEN SOURCE CVX/VAG CYTO: NORMAL
STAT OF ADQ CVX/VAG CYTO-IMP: NORMAL

## 2021-03-26 ENCOUNTER — IMMUNIZATION (OUTPATIENT)
Dept: PHARMACY | Facility: CLINIC | Age: 26
End: 2021-03-26
Payer: COMMERCIAL

## 2021-03-26 DIAGNOSIS — Z23 NEED FOR VACCINATION: Primary | ICD-10-CM

## 2021-12-10 ENCOUNTER — IMMUNIZATION (OUTPATIENT)
Dept: PHARMACY | Facility: CLINIC | Age: 26
End: 2021-12-10
Payer: COMMERCIAL

## 2021-12-10 DIAGNOSIS — Z23 NEED FOR VACCINATION: Primary | ICD-10-CM

## 2022-02-28 ENCOUNTER — OFFICE VISIT (OUTPATIENT)
Dept: OBSTETRICS AND GYNECOLOGY | Facility: CLINIC | Age: 27
End: 2022-02-28
Payer: COMMERCIAL

## 2022-02-28 VITALS
HEIGHT: 67 IN | DIASTOLIC BLOOD PRESSURE: 72 MMHG | SYSTOLIC BLOOD PRESSURE: 122 MMHG | BODY MASS INDEX: 28.89 KG/M2 | WEIGHT: 184.06 LBS

## 2022-02-28 DIAGNOSIS — Z01.419 ENCOUNTER FOR ANNUAL ROUTINE GYNECOLOGICAL EXAMINATION: Primary | ICD-10-CM

## 2022-02-28 PROCEDURE — 99999 PR PBB SHADOW E&M-EST. PATIENT-LVL III: ICD-10-PCS | Mod: PBBFAC,,, | Performed by: OBSTETRICS & GYNECOLOGY

## 2022-02-28 PROCEDURE — 1160F RVW MEDS BY RX/DR IN RCRD: CPT | Mod: CPTII,S$GLB,, | Performed by: OBSTETRICS & GYNECOLOGY

## 2022-02-28 PROCEDURE — 3078F PR MOST RECENT DIASTOLIC BLOOD PRESSURE < 80 MM HG: ICD-10-PCS | Mod: CPTII,S$GLB,, | Performed by: OBSTETRICS & GYNECOLOGY

## 2022-02-28 PROCEDURE — 99999 PR PBB SHADOW E&M-EST. PATIENT-LVL III: CPT | Mod: PBBFAC,,, | Performed by: OBSTETRICS & GYNECOLOGY

## 2022-02-28 PROCEDURE — 1160F PR REVIEW ALL MEDS BY PRESCRIBER/CLIN PHARMACIST DOCUMENTED: ICD-10-PCS | Mod: CPTII,S$GLB,, | Performed by: OBSTETRICS & GYNECOLOGY

## 2022-02-28 PROCEDURE — 3008F PR BODY MASS INDEX (BMI) DOCUMENTED: ICD-10-PCS | Mod: CPTII,S$GLB,, | Performed by: OBSTETRICS & GYNECOLOGY

## 2022-02-28 PROCEDURE — 3074F SYST BP LT 130 MM HG: CPT | Mod: CPTII,S$GLB,, | Performed by: OBSTETRICS & GYNECOLOGY

## 2022-02-28 PROCEDURE — 99395 PR PREVENTIVE VISIT,EST,18-39: ICD-10-PCS | Mod: S$GLB,,, | Performed by: OBSTETRICS & GYNECOLOGY

## 2022-02-28 PROCEDURE — 1159F PR MEDICATION LIST DOCUMENTED IN MEDICAL RECORD: ICD-10-PCS | Mod: CPTII,S$GLB,, | Performed by: OBSTETRICS & GYNECOLOGY

## 2022-02-28 PROCEDURE — 3008F BODY MASS INDEX DOCD: CPT | Mod: CPTII,S$GLB,, | Performed by: OBSTETRICS & GYNECOLOGY

## 2022-02-28 PROCEDURE — 3078F DIAST BP <80 MM HG: CPT | Mod: CPTII,S$GLB,, | Performed by: OBSTETRICS & GYNECOLOGY

## 2022-02-28 PROCEDURE — 99395 PREV VISIT EST AGE 18-39: CPT | Mod: S$GLB,,, | Performed by: OBSTETRICS & GYNECOLOGY

## 2022-02-28 PROCEDURE — 3074F PR MOST RECENT SYSTOLIC BLOOD PRESSURE < 130 MM HG: ICD-10-PCS | Mod: CPTII,S$GLB,, | Performed by: OBSTETRICS & GYNECOLOGY

## 2022-02-28 PROCEDURE — 1159F MED LIST DOCD IN RCRD: CPT | Mod: CPTII,S$GLB,, | Performed by: OBSTETRICS & GYNECOLOGY

## 2022-02-28 RX ORDER — LEVONORGESTREL AND ETHINYL ESTRADIOL 0.15-0.03
1 KIT ORAL DAILY
Qty: 90 TABLET | Refills: 3 | Status: SHIPPED | OUTPATIENT
Start: 2022-02-28 | End: 2023-02-24

## 2022-02-28 RX ORDER — AZELASTINE 1 MG/ML
SPRAY, METERED NASAL
COMMUNITY
Start: 2022-01-25 | End: 2022-06-09

## 2022-02-28 NOTE — PROGRESS NOTES
SUBJECTIVE:     Chief Complaint: Well Woman       History of Present Illness:  Annual Exam  Patient presents for annual exam.   She c/o nothing today.  LMP: 21  She denies any vd, vb, dyspareunia, dysuria, depression, anxiety.  Last pap was in  and was neg.  Birth Control: seasonale, wants to continue  Declines STD testing.     GYN screening history: denies  Mammogram history: na  Colonoscopy history: na  Dexa history: na    FH:   Breast cancer: maternal GM  Colon cancer: none  Ovarian cancer: none    Review of patient's allergies indicates:  No Known Allergies    History reviewed. No pertinent past medical history.  History reviewed. No pertinent surgical history.  OB History        0    Para   0    Term   0       0    AB   0    Living   0       SAB   0    IAB   0    Ectopic   0    Multiple   0    Live Births                   Family History   Problem Relation Age of Onset    Breast cancer Maternal Grandmother 70        Dec'd    Diabetes Paternal Grandfather     Stroke Paternal Grandfather     Ovarian cancer Neg Hx     Colon cancer Neg Hx      Social History     Tobacco Use    Smoking status: Never Smoker    Smokeless tobacco: Never Used   Substance Use Topics    Alcohol use: Yes     Alcohol/week: 0.0 standard drinks     Comment: socially    Drug use: No       Current Outpatient Medications   Medication Sig    levonorgestrel-ethinyl estradiol (SEASONALE) 0.15 mg-30 mcg (91) per tablet TAKE 1 TABLET BY MOUTH EVERY DAY    azelastine (ASTELIN) 137 mcg (0.1 %) nasal spray SMARTSI-2 Spray(s) Both Nares Twice Daily PRN    levonorgestrel-ethinyl estradiol (SEASONALE) 0.15 mg-30 mcg (91) per tablet Take 1 tablet by mouth once daily.     No current facility-administered medications for this visit.       Review of Systems:  GENERAL: No fever, chills, fatigability or weight loss.  CARDIOVASCULAR: No chest pain. No palpitations.  RESPIRATORY: No SOB, no wheezing.  BREAST: Denies pain. No  lumps. No discharge.  VULVAR: No pain, no lesions and no itching.  VAGINAL: No relaxation, no itching, no discharge, no abnormal bleeding and no lesions.  ABDOMEN: No abdominal pain. Denies nausea. Denies vomiting. No diarrhea. No constipation  URINARY: No incontinence, no nocturia, no frequency and no dysuria.  NEUROLOGICAL: No headaches. No vision changes.       OBJECTIVE:     Vitals:    02/28/22 1024   BP: 122/72       Patient verbally consents to pelvic and breast exams.  Physical Exam:  Gen: NAD, well developed, well-nourished  HEENT: Normocephalic, atraumatic  Eyes: EOM nl, conjuntivae normal  Neck: ROM normal, no thyromegaly  Respiratory: Effort normal   Abd: soft, nontender, no masses palpated  Breast: Normal bilaterally, no masses, lesions or tenderness. No nipple discharge on expression, no lymphadenopathy bilaterally.  SSE:  Vulva: no lesions or rashes  Cervix: No lesions noted, nonfriable, no vaginal discharge or vaginal bleeding noted  BME:   Cervix: No CMT  Adnexa: nl bilaterally, no masses or fullness palpated  Uterus: normal, nonenlarged  Musculoskeletal: normal ROM  Neuro: alert, AAOx3  Skin: warm and dry  Psych: mood/affect nl, behavior normal, judgement normal, thought content normal        ASSESSMENT:       ICD-10-CM ICD-9-CM    1. Encounter for annual routine gynecological examination  Z01.419 V72.31           Plan:      Absaraka was seen today for well woman.    Diagnoses and all orders for this visit:    Encounter for annual routine gynecological examination    Other orders  -     levonorgestrel-ethinyl estradiol (SEASONALE) 0.15 mg-30 mcg (91) per tablet; Take 1 tablet by mouth once daily.        No orders of the defined types were placed in this encounter.      Follow up in one year for annual, or prn.    Julie R Jeansonne

## 2022-06-06 NOTE — PROGRESS NOTES
ANNUAL VISIT NOTE     PRESENTING HISTORY     Reason for Visit:  Annual visit.    No chief complaint on file.    History of Present Illness & ROS: Ms. Evan Sousa is a 27 y.o. female.  Annual   New to me and clinic practice.   Very pleasant young lady.   Does not have a PCP.   Works out 5 times a week.   Feeling more tired lately.   Had a Covid in  and recovered uneventfully.     Review of Systems:  Eyes: denies visual changes at this time denies floaters   ENT: no nasal congestion or sore throat  Respiratory: no cough or shorness of breath  Cardiovascular: no chest pain or palpitations  Gastrointestinal: no nausea or vomiting, no abdominal pain or change in bowel habits  Genitourinary: no hematuria or dysuria; denies frequency  Hematologic/Lymphatic: no easy bruising or lymphadenopathy  Musculoskeletal: no arthralgias or myalgias  Neurological: no seizures or tremors  Endocrine: no heat or cold intolerance    PAST HISTORY:     No past medical history on file.    No past surgical history on file.    Family History   Problem Relation Age of Onset    Breast cancer Maternal Grandmother 70        Dec'd    Diabetes Paternal Grandfather     Stroke Paternal Grandfather     Ovarian cancer Neg Hx     Colon cancer Neg Hx        Social History     Socioeconomic History    Marital status: Single   Tobacco Use    Smoking status: Never Smoker    Smokeless tobacco: Never Used   Substance and Sexual Activity    Alcohol use: Yes     Alcohol/week: 0.0 standard drinks     Comment: socially    Drug use: No    Sexual activity: Yes     Partners: Male     Birth control/protection: Condom, OCP       MEDICATIONS & ALLERGIES:     Current Outpatient Medications on File Prior to Visit   Medication Sig Dispense Refill    azelastine (ASTELIN) 137 mcg (0.1 %) nasal spray SMARTSI-2 Spray(s) Both Nares Twice Daily PRN      levonorgestrel-ethinyl estradiol (SEASONALE) 0.15 mg-30 mcg (91) per tablet TAKE 1 TABLET BY MOUTH  EVERY DAY 91 tablet 3    levonorgestrel-ethinyl estradiol (SEASONALE) 0.15 mg-30 mcg (91) per tablet Take 1 tablet by mouth once daily. 90 tablet 3     No current facility-administered medications on file prior to visit.        Review of patient's allergies indicates:  No Known Allergies    Medications Reconciliation:   I have reconciled the patient's home medications and discharge medications with the patient/family. I have updated all changes.  Refer to After-Visit Medication List.    OBJECTIVE:     Vital Signs:  There were no vitals filed for this visit.  Wt Readings from Last 3 Encounters:   02/28/22 1024 83.5 kg (184 lb 1.4 oz)   03/10/21 1332 81.8 kg (180 lb 5.4 oz)   02/07/20 1314 83.9 kg (184 lb 15.5 oz)     There is no height or weight on file to calculate BMI.   Wt Readings from Last 3 Encounters:   06/09/22 83.5 kg (184 lb 1.4 oz)   02/28/22 83.5 kg (184 lb 1.4 oz)   03/10/21 81.8 kg (180 lb 5.4 oz)     Temp Readings from Last 3 Encounters:   07/27/17 98 °F (36.7 °C) (Tympanic)   01/23/17 98.4 °F (36.9 °C) (Tympanic)   01/09/17 98.3 °F (36.8 °C) (Tympanic)     BP Readings from Last 3 Encounters:   06/09/22 112/72   02/28/22 122/72   03/10/21 100/76     Pulse Readings from Last 3 Encounters:   06/09/22 75   07/27/17 83   01/23/17 75     Physical Exam:  General: Well developed, well nourished. No distress.  HEENT: Head is normocephalic, atraumatic; ears are normal.   Eyes: Clear conjunctiva.  Neck: Supple, symmetrical neck; trachea midline.  Lungs: Clear to auscultation bilaterally and normal respiratory effort.  Cardiovascular: Heart with regular rate and rhythm. No murmurs, gallops or rubs  Extremities: No LE edema. Pulses 2+ and symmetric.   Abdomen: Abdomen is soft, non-tender non-distended with normal bowel sounds.  Skin: Skin color, texture, turgor normal. No rashes.  Musculoskeletal: Normal gait.   Lymph Nodes: No cervical or supraclavicular adenopathy.  Neurologic: Normal strength and tone. No  focal numbness or weakness.   Psychiatric: Not depressed.    Laboratory  Lab Results   Component Value Date    WBC 9.32 01/09/2017    HGB 14.0 01/09/2017    HCT 41.1 01/09/2017     (H) 01/09/2017    CHOL 234 (H) 07/28/2017    TRIG 197 (H) 07/28/2017    HDL 56 07/28/2017     01/09/2017    K 4.0 01/09/2017     01/09/2017    CREATININE 0.7 01/09/2017    BUN 10 01/09/2017    CO2 23 01/09/2017    TSH 1.017 01/09/2017         ASSESSMENT & PLAN:     Annual physical exam  -     Comprehensive Metabolic Panel; Future; Expected date: 06/09/2022  -     CBC Auto Differential; Future; Expected date: 06/09/2022  -     Lipid Panel; Future; Expected date: 06/09/2022  -     Hepatitis C Antibody; Future; Expected date: 06/09/2022  -     Hemoglobin A1C; Future; Expected date: 06/09/2022  -     TSH; Future; Expected date: 06/09/2022  -     Vitamin D; Future; Expected date: 06/09/2022  -     Iron and TIBC; Future; Expected date: 06/09/2022  -     Ferritin; Future; Expected date: 06/09/2022  -     HIV 1/2 Ag/Ab (4th Gen); Future; Expected date: 06/09/2022  -     Vitamin B12; Future; Expected date: 06/09/2022    Fatigue, unspecified type  -     Vitamin B12; Future; Expected date: 06/09/2022  -     TSH; Future; Expected date: 06/09/2022  -     Iron and TIBC; Future; Expected date: 06/09/2022  -     Ferritin; Future; Expected date: 06/09/2022  -     Comprehensive Metabolic Panel; Future; Expected date: 06/09/2022  -     CBC Auto Differential; Future; Expected date: 06/09/2022      Future Appointments   Date Time Provider Department Center   9/9/2022  8:00 AM Mulugeta Magallanes MD [facility] Chucky SANDERSON        Medication List          Accurate as of June 9, 2022 10:26 AM. If you have any questions, ask your nurse or doctor.            CONTINUE taking these medications    * levonorgestrel-ethinyl estradiol 0.15 mg-30 mcg (91) per tablet  Commonly known as: SEASONALE  TAKE 1 TABLET BY MOUTH EVERY DAY     * levonorgestrel-ethinyl  estradiol 0.15 mg-30 mcg (91) per tablet  Commonly known as: SEASONALE  Take 1 tablet by mouth once daily.         * This list has 2 medication(s) that are the same as other medications prescribed for you. Read the directions carefully, and ask your doctor or other care provider to review them with you.            STOP taking these medications    azelastine 137 mcg (0.1 %) nasal spray  Commonly known as: ASTELIN  Stopped by: TAYLOR Fragoso            Signing Physician:  TAYLOR Fragoso

## 2022-06-09 ENCOUNTER — LAB VISIT (OUTPATIENT)
Dept: LAB | Facility: HOSPITAL | Age: 27
End: 2022-06-09
Payer: COMMERCIAL

## 2022-06-09 ENCOUNTER — OFFICE VISIT (OUTPATIENT)
Dept: INTERNAL MEDICINE | Facility: CLINIC | Age: 27
End: 2022-06-09
Payer: COMMERCIAL

## 2022-06-09 VITALS
DIASTOLIC BLOOD PRESSURE: 72 MMHG | BODY MASS INDEX: 29.58 KG/M2 | WEIGHT: 184.06 LBS | HEART RATE: 75 BPM | HEIGHT: 66 IN | OXYGEN SATURATION: 98 % | SYSTOLIC BLOOD PRESSURE: 112 MMHG

## 2022-06-09 DIAGNOSIS — Z00.00 ANNUAL PHYSICAL EXAM: ICD-10-CM

## 2022-06-09 DIAGNOSIS — Z00.00 ANNUAL PHYSICAL EXAM: Primary | ICD-10-CM

## 2022-06-09 DIAGNOSIS — R53.83 FATIGUE, UNSPECIFIED TYPE: ICD-10-CM

## 2022-06-09 LAB
25(OH)D3+25(OH)D2 SERPL-MCNC: 55 NG/ML (ref 30–96)
ALBUMIN SERPL BCP-MCNC: 4 G/DL (ref 3.5–5.2)
ALP SERPL-CCNC: 53 U/L (ref 55–135)
ALT SERPL W/O P-5'-P-CCNC: 19 U/L (ref 10–44)
ANION GAP SERPL CALC-SCNC: 13 MMOL/L (ref 8–16)
AST SERPL-CCNC: 18 U/L (ref 10–40)
BASOPHILS # BLD AUTO: 0.11 K/UL (ref 0–0.2)
BASOPHILS NFR BLD: 1.2 % (ref 0–1.9)
BILIRUB SERPL-MCNC: 0.5 MG/DL (ref 0.1–1)
BUN SERPL-MCNC: 11 MG/DL (ref 6–20)
CALCIUM SERPL-MCNC: 9.8 MG/DL (ref 8.7–10.5)
CHLORIDE SERPL-SCNC: 108 MMOL/L (ref 95–110)
CHOLEST SERPL-MCNC: 281 MG/DL (ref 120–199)
CHOLEST/HDLC SERPL: 4.1 {RATIO} (ref 2–5)
CO2 SERPL-SCNC: 20 MMOL/L (ref 23–29)
CREAT SERPL-MCNC: 0.8 MG/DL (ref 0.5–1.4)
DIFFERENTIAL METHOD: ABNORMAL
EOSINOPHIL # BLD AUTO: 0.7 K/UL (ref 0–0.5)
EOSINOPHIL NFR BLD: 8.1 % (ref 0–8)
ERYTHROCYTE [DISTWIDTH] IN BLOOD BY AUTOMATED COUNT: 12.3 % (ref 11.5–14.5)
EST. GFR  (AFRICAN AMERICAN): >60 ML/MIN/1.73 M^2
EST. GFR  (NON AFRICAN AMERICAN): >60 ML/MIN/1.73 M^2
ESTIMATED AVG GLUCOSE: 94 MG/DL (ref 68–131)
FERRITIN SERPL-MCNC: 63 NG/ML (ref 20–300)
GLUCOSE SERPL-MCNC: 97 MG/DL (ref 70–110)
HBA1C MFR BLD: 4.9 % (ref 4–5.6)
HCT VFR BLD AUTO: 47 % (ref 37–48.5)
HDLC SERPL-MCNC: 68 MG/DL (ref 40–75)
HDLC SERPL: 24.2 % (ref 20–50)
HGB BLD-MCNC: 15.2 G/DL (ref 12–16)
IMM GRANULOCYTES # BLD AUTO: 0.02 K/UL (ref 0–0.04)
IMM GRANULOCYTES NFR BLD AUTO: 0.2 % (ref 0–0.5)
IRON SERPL-MCNC: 100 UG/DL (ref 30–160)
LDLC SERPL CALC-MCNC: 184 MG/DL (ref 63–159)
LYMPHOCYTES # BLD AUTO: 4 K/UL (ref 1–4.8)
LYMPHOCYTES NFR BLD: 45.3 % (ref 18–48)
MCH RBC QN AUTO: 29 PG (ref 27–31)
MCHC RBC AUTO-ENTMCNC: 32.3 G/DL (ref 32–36)
MCV RBC AUTO: 90 FL (ref 82–98)
MONOCYTES # BLD AUTO: 0.4 K/UL (ref 0.3–1)
MONOCYTES NFR BLD: 3.9 % (ref 4–15)
NEUTROPHILS # BLD AUTO: 3.7 K/UL (ref 1.8–7.7)
NEUTROPHILS NFR BLD: 41.3 % (ref 38–73)
NONHDLC SERPL-MCNC: 213 MG/DL
NRBC BLD-RTO: 0 /100 WBC
PLATELET # BLD AUTO: 406 K/UL (ref 150–450)
PMV BLD AUTO: 10.9 FL (ref 9.2–12.9)
POTASSIUM SERPL-SCNC: 4.2 MMOL/L (ref 3.5–5.1)
PROT SERPL-MCNC: 7.5 G/DL (ref 6–8.4)
RBC # BLD AUTO: 5.25 M/UL (ref 4–5.4)
SATURATED IRON: 24 % (ref 20–50)
SODIUM SERPL-SCNC: 141 MMOL/L (ref 136–145)
TOTAL IRON BINDING CAPACITY: 417 UG/DL (ref 250–450)
TRANSFERRIN SERPL-MCNC: 282 MG/DL (ref 200–375)
TRIGL SERPL-MCNC: 145 MG/DL (ref 30–150)
TSH SERPL DL<=0.005 MIU/L-ACNC: 0.65 UIU/ML (ref 0.4–4)
VIT B12 SERPL-MCNC: 245 PG/ML (ref 210–950)
WBC # BLD AUTO: 8.9 K/UL (ref 3.9–12.7)

## 2022-06-09 PROCEDURE — 3074F SYST BP LT 130 MM HG: CPT | Mod: CPTII,S$GLB,, | Performed by: NURSE PRACTITIONER

## 2022-06-09 PROCEDURE — 83036 HEMOGLOBIN GLYCOSYLATED A1C: CPT | Performed by: NURSE PRACTITIONER

## 2022-06-09 PROCEDURE — 36415 COLL VENOUS BLD VENIPUNCTURE: CPT | Performed by: NURSE PRACTITIONER

## 2022-06-09 PROCEDURE — 3078F PR MOST RECENT DIASTOLIC BLOOD PRESSURE < 80 MM HG: ICD-10-PCS | Mod: CPTII,S$GLB,, | Performed by: NURSE PRACTITIONER

## 2022-06-09 PROCEDURE — 84466 ASSAY OF TRANSFERRIN: CPT | Performed by: NURSE PRACTITIONER

## 2022-06-09 PROCEDURE — 3078F DIAST BP <80 MM HG: CPT | Mod: CPTII,S$GLB,, | Performed by: NURSE PRACTITIONER

## 2022-06-09 PROCEDURE — 3008F BODY MASS INDEX DOCD: CPT | Mod: CPTII,S$GLB,, | Performed by: NURSE PRACTITIONER

## 2022-06-09 PROCEDURE — 3074F PR MOST RECENT SYSTOLIC BLOOD PRESSURE < 130 MM HG: ICD-10-PCS | Mod: CPTII,S$GLB,, | Performed by: NURSE PRACTITIONER

## 2022-06-09 PROCEDURE — 1159F MED LIST DOCD IN RCRD: CPT | Mod: CPTII,S$GLB,, | Performed by: NURSE PRACTITIONER

## 2022-06-09 PROCEDURE — 87389 HIV-1 AG W/HIV-1&-2 AB AG IA: CPT | Performed by: NURSE PRACTITIONER

## 2022-06-09 PROCEDURE — 82306 VITAMIN D 25 HYDROXY: CPT | Performed by: NURSE PRACTITIONER

## 2022-06-09 PROCEDURE — 99999 PR PBB SHADOW E&M-EST. PATIENT-LVL III: ICD-10-PCS | Mod: PBBFAC,,, | Performed by: NURSE PRACTITIONER

## 2022-06-09 PROCEDURE — 85025 COMPLETE CBC W/AUTO DIFF WBC: CPT | Performed by: NURSE PRACTITIONER

## 2022-06-09 PROCEDURE — 82728 ASSAY OF FERRITIN: CPT | Performed by: NURSE PRACTITIONER

## 2022-06-09 PROCEDURE — 99395 PR PREVENTIVE VISIT,EST,18-39: ICD-10-PCS | Mod: S$GLB,,, | Performed by: NURSE PRACTITIONER

## 2022-06-09 PROCEDURE — 82607 VITAMIN B-12: CPT | Performed by: NURSE PRACTITIONER

## 2022-06-09 PROCEDURE — 99999 PR PBB SHADOW E&M-EST. PATIENT-LVL III: CPT | Mod: PBBFAC,,, | Performed by: NURSE PRACTITIONER

## 2022-06-09 PROCEDURE — 80061 LIPID PANEL: CPT | Performed by: NURSE PRACTITIONER

## 2022-06-09 PROCEDURE — 80053 COMPREHEN METABOLIC PANEL: CPT | Performed by: NURSE PRACTITIONER

## 2022-06-09 PROCEDURE — 84443 ASSAY THYROID STIM HORMONE: CPT | Performed by: NURSE PRACTITIONER

## 2022-06-09 PROCEDURE — 86803 HEPATITIS C AB TEST: CPT | Performed by: NURSE PRACTITIONER

## 2022-06-09 PROCEDURE — 1159F PR MEDICATION LIST DOCUMENTED IN MEDICAL RECORD: ICD-10-PCS | Mod: CPTII,S$GLB,, | Performed by: NURSE PRACTITIONER

## 2022-06-09 PROCEDURE — 3008F PR BODY MASS INDEX (BMI) DOCUMENTED: ICD-10-PCS | Mod: CPTII,S$GLB,, | Performed by: NURSE PRACTITIONER

## 2022-06-09 PROCEDURE — 1160F RVW MEDS BY RX/DR IN RCRD: CPT | Mod: CPTII,S$GLB,, | Performed by: NURSE PRACTITIONER

## 2022-06-09 PROCEDURE — 1160F PR REVIEW ALL MEDS BY PRESCRIBER/CLIN PHARMACIST DOCUMENTED: ICD-10-PCS | Mod: CPTII,S$GLB,, | Performed by: NURSE PRACTITIONER

## 2022-06-09 PROCEDURE — 99395 PREV VISIT EST AGE 18-39: CPT | Mod: S$GLB,,, | Performed by: NURSE PRACTITIONER

## 2022-06-10 LAB
HCV AB SERPL QL IA: NEGATIVE
HIV 1+2 AB+HIV1 P24 AG SERPL QL IA: NEGATIVE

## 2022-06-11 ENCOUNTER — PATIENT MESSAGE (OUTPATIENT)
Dept: INTERNAL MEDICINE | Facility: CLINIC | Age: 27
End: 2022-06-11
Payer: COMMERCIAL

## 2022-09-08 ENCOUNTER — OFFICE VISIT (OUTPATIENT)
Dept: INTERNAL MEDICINE | Facility: CLINIC | Age: 27
End: 2022-09-08
Payer: COMMERCIAL

## 2022-09-08 VITALS
DIASTOLIC BLOOD PRESSURE: 75 MMHG | OXYGEN SATURATION: 98 % | SYSTOLIC BLOOD PRESSURE: 115 MMHG | HEART RATE: 85 BPM | BODY MASS INDEX: 29.76 KG/M2 | HEIGHT: 66 IN | WEIGHT: 185.19 LBS

## 2022-09-08 DIAGNOSIS — M79.673 HEEL PAIN, UNSPECIFIED LATERALITY: ICD-10-CM

## 2022-09-08 DIAGNOSIS — E78.2 MIXED HYPERLIPIDEMIA: Primary | ICD-10-CM

## 2022-09-08 PROCEDURE — 3078F PR MOST RECENT DIASTOLIC BLOOD PRESSURE < 80 MM HG: ICD-10-PCS | Mod: CPTII,S$GLB,, | Performed by: INTERNAL MEDICINE

## 2022-09-08 PROCEDURE — 99999 PR PBB SHADOW E&M-EST. PATIENT-LVL IV: CPT | Mod: PBBFAC,,, | Performed by: INTERNAL MEDICINE

## 2022-09-08 PROCEDURE — 3044F PR MOST RECENT HEMOGLOBIN A1C LEVEL <7.0%: ICD-10-PCS | Mod: CPTII,S$GLB,, | Performed by: INTERNAL MEDICINE

## 2022-09-08 PROCEDURE — 3044F HG A1C LEVEL LT 7.0%: CPT | Mod: CPTII,S$GLB,, | Performed by: INTERNAL MEDICINE

## 2022-09-08 PROCEDURE — 3074F PR MOST RECENT SYSTOLIC BLOOD PRESSURE < 130 MM HG: ICD-10-PCS | Mod: CPTII,S$GLB,, | Performed by: INTERNAL MEDICINE

## 2022-09-08 PROCEDURE — 99999 PR PBB SHADOW E&M-EST. PATIENT-LVL IV: ICD-10-PCS | Mod: PBBFAC,,, | Performed by: INTERNAL MEDICINE

## 2022-09-08 PROCEDURE — 3008F BODY MASS INDEX DOCD: CPT | Mod: CPTII,S$GLB,, | Performed by: INTERNAL MEDICINE

## 2022-09-08 PROCEDURE — 3008F PR BODY MASS INDEX (BMI) DOCUMENTED: ICD-10-PCS | Mod: CPTII,S$GLB,, | Performed by: INTERNAL MEDICINE

## 2022-09-08 PROCEDURE — 99214 OFFICE O/P EST MOD 30 MIN: CPT | Mod: S$GLB,,, | Performed by: INTERNAL MEDICINE

## 2022-09-08 PROCEDURE — 1159F MED LIST DOCD IN RCRD: CPT | Mod: CPTII,S$GLB,, | Performed by: INTERNAL MEDICINE

## 2022-09-08 PROCEDURE — 3074F SYST BP LT 130 MM HG: CPT | Mod: CPTII,S$GLB,, | Performed by: INTERNAL MEDICINE

## 2022-09-08 PROCEDURE — 3078F DIAST BP <80 MM HG: CPT | Mod: CPTII,S$GLB,, | Performed by: INTERNAL MEDICINE

## 2022-09-08 PROCEDURE — 99214 PR OFFICE/OUTPT VISIT, EST, LEVL IV, 30-39 MIN: ICD-10-PCS | Mod: S$GLB,,, | Performed by: INTERNAL MEDICINE

## 2022-09-08 PROCEDURE — 1159F PR MEDICATION LIST DOCUMENTED IN MEDICAL RECORD: ICD-10-PCS | Mod: CPTII,S$GLB,, | Performed by: INTERNAL MEDICINE

## 2022-09-08 RX ORDER — DICLOFENAC SODIUM 50 MG/1
50 TABLET, DELAYED RELEASE ORAL 2 TIMES DAILY
Qty: 28 TABLET | Refills: 0 | Status: SHIPPED | OUTPATIENT
Start: 2022-09-08 | End: 2022-09-22

## 2022-09-08 NOTE — PROGRESS NOTES
Subjective:       Patient ID: Evan Sousa is a 27 y.o. female.    Chief Complaint: Establish Care    HPI    Ms. Sousa is a 26 yo female who presents to [facility].     She had been having bilateral heel pain for a few months now. Does run and work 5 days a week. Pain over heel and plantar area.     She was noted to have elevated Lipids on last labs. LDL was 184. Has been trying to work on diet since then.     Health Maintenance:    HIV: negative 2022   Hep C: negative 2022  Lipids:Order today   Pap Smear: Done March 2021 and was normal.   Vaccines: Tdap 8/2021, Flu due in 9/2022      Review of Systems   Constitutional:  Negative for activity change, appetite change and chills.   HENT:  Negative for ear pain, sinus pressure/congestion and sneezing.    Respiratory:  Negative for cough and shortness of breath.    Cardiovascular:  Negative for chest pain, palpitations and leg swelling.   Gastrointestinal:  Negative for abdominal distention, abdominal pain, constipation, diarrhea, nausea and vomiting.   Genitourinary:  Negative for dysuria and hematuria.   Musculoskeletal:  Positive for arthralgias. Negative for back pain and myalgias.        + Foot pain    Neurological:  Negative for dizziness and headaches.   Psychiatric/Behavioral:  Negative for agitation. The patient is not nervous/anxious.          Past Medical History:   Diagnosis Date    COVID-19     2020     No past surgical history on file.   Patient Active Problem List   Diagnosis    Near syncope    Reactive hypoglycemia        Objective:      Physical Exam  Constitutional:       Appearance: Normal appearance.   HENT:      Head: Normocephalic.      Right Ear: Tympanic membrane normal.      Left Ear: Tympanic membrane normal.      Nose: Nose normal.   Cardiovascular:      Rate and Rhythm: Normal rate and regular rhythm.      Pulses: Normal pulses.      Heart sounds: Normal heart sounds.   Pulmonary:      Effort: Pulmonary effort is normal.       Breath sounds: Normal breath sounds.   Abdominal:      General: Abdomen is flat. Bowel sounds are normal.      Palpations: Abdomen is soft.   Musculoskeletal:         General: Normal range of motion.      Cervical back: Normal range of motion and neck supple.   Feet:      Comments: TTP over heels     Skin:     General: Skin is warm and dry.   Neurological:      General: No focal deficit present.      Mental Status: She is alert and oriented to person, place, and time.   Psychiatric:         Mood and Affect: Mood normal.       Assessment:       Problem List Items Addressed This Visit    None  Visit Diagnoses       Mixed hyperlipidemia    -  Primary    Relevant Orders    LIPID PANEL    Heel pain, unspecified laterality        Relevant Orders    X-Ray Foot Complete 3 view Right    X-Ray Foot Complete 3 view Left              Plan:         Evan was seen today for establish care.    Diagnoses and all orders for this visit:    Mixed hyperlipidemia  We discussed high fiber low diet and taking omega 3.  She will continue dietay modifications and we will repeat the lipids in 3 months.   Will start statin if not improved at that time.     Heel pain, unspecified laterality  Possible plantar fascitis   Check XRAy  Start 2 week course of Voltaren BID      Follow up in 3 months.              Maria T Sierra MD   Internal Medicine   Primary Care Physician

## 2022-09-09 ENCOUNTER — HOSPITAL ENCOUNTER (OUTPATIENT)
Dept: RADIOLOGY | Facility: HOSPITAL | Age: 27
Discharge: HOME | End: 2022-09-09
Attending: INTERNAL MEDICINE
Payer: COMMERCIAL

## 2022-09-09 DIAGNOSIS — M79.673 HEEL PAIN, UNSPECIFIED LATERALITY: ICD-10-CM

## 2022-09-09 PROCEDURE — 73630 X-RAY EXAM OF FOOT: CPT | Mod: TC,50

## 2022-09-09 PROCEDURE — 73630 XR FOOT COMPLETE 3 VIEW BILATERAL: ICD-10-PCS | Mod: 26,50,, | Performed by: RADIOLOGY

## 2022-09-09 PROCEDURE — 73630 X-RAY EXAM OF FOOT: CPT | Mod: 26,50,, | Performed by: RADIOLOGY

## 2022-09-13 ENCOUNTER — TELEPHONE (OUTPATIENT)
Dept: INTERNAL MEDICINE | Facility: CLINIC | Age: 27
End: 2022-09-13
Payer: COMMERCIAL

## 2022-09-13 DIAGNOSIS — M79.672 PAIN IN BOTH FEET: Primary | ICD-10-CM

## 2022-09-13 DIAGNOSIS — M79.671 PAIN IN BOTH FEET: Primary | ICD-10-CM

## 2022-09-13 NOTE — TELEPHONE ENCOUNTER
----- Message from Maria T Sierra MD sent at 9/13/2022  8:31 AM CDT -----  Hi!     Can we help her with the podiatry appt?    Maria T Sierra MD

## 2022-09-15 ENCOUNTER — OFFICE VISIT (OUTPATIENT)
Dept: PODIATRY | Facility: CLINIC | Age: 27
End: 2022-09-15
Payer: COMMERCIAL

## 2022-09-15 VITALS
DIASTOLIC BLOOD PRESSURE: 82 MMHG | SYSTOLIC BLOOD PRESSURE: 120 MMHG | HEART RATE: 86 BPM | HEIGHT: 66 IN | BODY MASS INDEX: 29.76 KG/M2 | WEIGHT: 185.19 LBS

## 2022-09-15 DIAGNOSIS — M72.2 PLANTAR FASCIITIS: Primary | ICD-10-CM

## 2022-09-15 DIAGNOSIS — M79.671 FOOT PAIN, BILATERAL: ICD-10-CM

## 2022-09-15 DIAGNOSIS — M79.672 FOOT PAIN, BILATERAL: ICD-10-CM

## 2022-09-15 DIAGNOSIS — M24.573 EQUINUS CONTRACTURE OF ANKLE: ICD-10-CM

## 2022-09-15 DIAGNOSIS — M77.9 ENTHESOPATHY: ICD-10-CM

## 2022-09-15 PROCEDURE — 1159F PR MEDICATION LIST DOCUMENTED IN MEDICAL RECORD: ICD-10-PCS | Mod: CPTII,S$GLB,, | Performed by: PODIATRIST

## 2022-09-15 PROCEDURE — 3079F DIAST BP 80-89 MM HG: CPT | Mod: CPTII,S$GLB,, | Performed by: PODIATRIST

## 2022-09-15 PROCEDURE — 29540 STRAPPING ANKLE &/FOOT: CPT | Mod: 50,S$GLB,, | Performed by: PODIATRIST

## 2022-09-15 PROCEDURE — 3008F BODY MASS INDEX DOCD: CPT | Mod: CPTII,S$GLB,, | Performed by: PODIATRIST

## 2022-09-15 PROCEDURE — 3044F HG A1C LEVEL LT 7.0%: CPT | Mod: CPTII,S$GLB,, | Performed by: PODIATRIST

## 2022-09-15 PROCEDURE — 3044F PR MOST RECENT HEMOGLOBIN A1C LEVEL <7.0%: ICD-10-PCS | Mod: CPTII,S$GLB,, | Performed by: PODIATRIST

## 2022-09-15 PROCEDURE — 3079F PR MOST RECENT DIASTOLIC BLOOD PRESSURE 80-89 MM HG: ICD-10-PCS | Mod: CPTII,S$GLB,, | Performed by: PODIATRIST

## 2022-09-15 PROCEDURE — 3074F SYST BP LT 130 MM HG: CPT | Mod: CPTII,S$GLB,, | Performed by: PODIATRIST

## 2022-09-15 PROCEDURE — 99204 PR OFFICE/OUTPT VISIT, NEW, LEVL IV, 45-59 MIN: ICD-10-PCS | Mod: 25,S$GLB,, | Performed by: PODIATRIST

## 2022-09-15 PROCEDURE — 1159F MED LIST DOCD IN RCRD: CPT | Mod: CPTII,S$GLB,, | Performed by: PODIATRIST

## 2022-09-15 PROCEDURE — 99204 OFFICE O/P NEW MOD 45 MIN: CPT | Mod: 25,S$GLB,, | Performed by: PODIATRIST

## 2022-09-15 PROCEDURE — 3074F PR MOST RECENT SYSTOLIC BLOOD PRESSURE < 130 MM HG: ICD-10-PCS | Mod: CPTII,S$GLB,, | Performed by: PODIATRIST

## 2022-09-15 PROCEDURE — 3008F PR BODY MASS INDEX (BMI) DOCUMENTED: ICD-10-PCS | Mod: CPTII,S$GLB,, | Performed by: PODIATRIST

## 2022-09-15 PROCEDURE — 99999 PR PBB SHADOW E&M-EST. PATIENT-LVL III: CPT | Mod: PBBFAC,,, | Performed by: PODIATRIST

## 2022-09-15 PROCEDURE — 99999 PR PBB SHADOW E&M-EST. PATIENT-LVL III: ICD-10-PCS | Mod: PBBFAC,,, | Performed by: PODIATRIST

## 2022-09-15 PROCEDURE — 29540 PR STRAPPING; ANKLE &/OR FOOT: ICD-10-PCS | Mod: 50,S$GLB,, | Performed by: PODIATRIST

## 2022-09-15 NOTE — PROGRESS NOTES
Subjective:      Patient ID: Evan Sousa is a 27 y.o. female.    Chief Complaint: Foot Pain (Bilateral foot)    Sharp deep pain bottom and back of heels right and left.  Gradual onset, worsening over past several weeks, aggravated by increased weight bearing, shoe gear, pressure.  No previous medical treatment.  OTC pain med not helping. Denies trauma, surgery both feet.  Xrays negative acute injury both feet.    Review of Systems   Constitutional: Negative for chills, diaphoresis, fever, malaise/fatigue and night sweats.   Cardiovascular:  Negative for claudication, cyanosis, leg swelling and syncope.   Skin:  Negative for color change, dry skin, nail changes, rash, suspicious lesions and unusual hair distribution.   Musculoskeletal:  Negative for falls, joint pain, joint swelling, muscle cramps, muscle weakness and stiffness.   Gastrointestinal:  Negative for constipation, diarrhea, nausea and vomiting.   Neurological:  Negative for brief paralysis, disturbances in coordination, focal weakness, numbness, paresthesias, sensory change and tremors.         Objective:      Physical Exam  Constitutional:       General: She is not in acute distress.     Appearance: She is well-developed. She is not diaphoretic.   Cardiovascular:      Pulses:           Popliteal pulses are 2+ on the right side and 2+ on the left side.        Dorsalis pedis pulses are 2+ on the right side and 2+ on the left side.        Posterior tibial pulses are 2+ on the right side and 2+ on the left side.      Comments: Capillary refill 3 seconds all toes/distal feet, all toes/both feet warm to touch.      Negative lymphadenopathy bilateral popliteal fossa and tarsal tunnel.      Negavie lower extremity edema bilateral.    Musculoskeletal:      Right ankle: No swelling, deformity, ecchymosis or lacerations. Normal range of motion. Normal pulse.      Right Achilles Tendon: Normal. No defects. Davis's test negative.      Comments: Sharp deep  pain to palpation inferior heel right and left at medial calcaneal tubercle without ecchymosis, erythema, edema, or cardinal signs infection, and no signs of trauma.    Pain to palpation posterior heel both feet. with visible and palpable hard prominence of bone.  No evidence of trauma or infection present today.     Otherwise, Normal angle, base, station of gait. All ten toes without clubbing, cyanosis, or signs of ischemia.  No pain to palpation bilateral lower extremities.  Range of motion, stability, muscle strength, and muscle tone normal bilateral feet and legs.    Lymphadenopathy:      Lower Body: No right inguinal adenopathy. No left inguinal adenopathy.      Comments: Negative lymphadenopathy bilateral popliteal fossa and tarsal tunnel.    Negative lymphangitic streaking bilateral feet/ankles/legs.   Skin:     General: Skin is warm and dry.      Capillary Refill: Capillary refill takes 2 to 3 seconds.      Coloration: Skin is not pale.      Findings: No abrasion, bruising, burn, ecchymosis, erythema, laceration, lesion or rash.      Nails: There is no clubbing.      Comments: Skin is normal age and health appropriate color, turgor, texture, and temperature bilateral lower extremities without ulceration, hyperpigmentation, discoloration, masses nodules or cords palpated.  No ecchymosis, erythema, edema, or cardinal signs of infection bilateral lower extremities.       Neurological:      Mental Status: She is alert and oriented to person, place, and time.      Sensory: No sensory deficit.      Motor: No tremor, atrophy or abnormal muscle tone.      Gait: Gait normal.      Deep Tendon Reflexes:      Reflex Scores:       Patellar reflexes are 2+ on the right side and 2+ on the left side.       Achilles reflexes are 2+ on the right side and 2+ on the left side.     Comments: Negative tinel sign to percussion sural, superficial peroneal, deep peroneal, saphenous, and posterior tibial nerves right and left ankles  and feet.     Psychiatric:         Behavior: Behavior is cooperative.           Assessment:       No diagnosis found.      Plan:       There are no diagnoses linked to this encounter.  I counseled the patient on her conditions, their implications and medical management.        Patient will stretch the tendo achilles complex three times daily as demonstrated in the office.  Literature was dispensed illustrating proper stretching technique.    I applied a plantar rest strapping to the patient's right and left foot to offload symptomatic area, support the arch, and relieve pain.    Patient will obtain over the counter arch supports and wear them in shoes whenever possible.  Athletic shoes intended for walking or running are usually best.    The patient was advised that NSAID-type medications have two very important potential side effects: gastrointestinal irritation including hemorrhage and renal injuries. She was asked to take the medication with food and to stop if she experiences any GI upset. I asked her to call for vomiting, abdominal pain or black/bloody stools. The patient expresses understanding of these issues and questions were answered.    Discussed conservative treatment with shoes of adequate dimensions, material, and style to alleviate symptoms and delay or prevent surgical intervention.    meloxicam          No follow-ups on file.

## 2022-11-01 ENCOUNTER — OFFICE VISIT (OUTPATIENT)
Dept: PODIATRY | Facility: CLINIC | Age: 27
End: 2022-11-01
Payer: COMMERCIAL

## 2022-11-01 VITALS
WEIGHT: 185 LBS | BODY MASS INDEX: 29.73 KG/M2 | HEIGHT: 66 IN | HEART RATE: 80 BPM | SYSTOLIC BLOOD PRESSURE: 143 MMHG | DIASTOLIC BLOOD PRESSURE: 84 MMHG

## 2022-11-01 DIAGNOSIS — M79.672 FOOT PAIN, BILATERAL: ICD-10-CM

## 2022-11-01 DIAGNOSIS — M79.671 FOOT PAIN, BILATERAL: ICD-10-CM

## 2022-11-01 DIAGNOSIS — M72.2 PLANTAR FASCIITIS: Primary | ICD-10-CM

## 2022-11-01 DIAGNOSIS — M24.573 EQUINUS CONTRACTURE OF ANKLE: ICD-10-CM

## 2022-11-01 PROCEDURE — 3044F PR MOST RECENT HEMOGLOBIN A1C LEVEL <7.0%: ICD-10-PCS | Mod: CPTII,S$GLB,, | Performed by: PODIATRIST

## 2022-11-01 PROCEDURE — 1159F PR MEDICATION LIST DOCUMENTED IN MEDICAL RECORD: ICD-10-PCS | Mod: CPTII,S$GLB,, | Performed by: PODIATRIST

## 2022-11-01 PROCEDURE — 3044F HG A1C LEVEL LT 7.0%: CPT | Mod: CPTII,S$GLB,, | Performed by: PODIATRIST

## 2022-11-01 PROCEDURE — 99214 PR OFFICE/OUTPT VISIT, EST, LEVL IV, 30-39 MIN: ICD-10-PCS | Mod: 25,S$GLB,, | Performed by: PODIATRIST

## 2022-11-01 PROCEDURE — 3077F SYST BP >= 140 MM HG: CPT | Mod: CPTII,S$GLB,, | Performed by: PODIATRIST

## 2022-11-01 PROCEDURE — 3008F BODY MASS INDEX DOCD: CPT | Mod: CPTII,S$GLB,, | Performed by: PODIATRIST

## 2022-11-01 PROCEDURE — 20550 PR INJECT TENDON SHEATH/LIGAMENT: ICD-10-PCS | Mod: RT,S$GLB,, | Performed by: PODIATRIST

## 2022-11-01 PROCEDURE — 3077F PR MOST RECENT SYSTOLIC BLOOD PRESSURE >= 140 MM HG: ICD-10-PCS | Mod: CPTII,S$GLB,, | Performed by: PODIATRIST

## 2022-11-01 PROCEDURE — 1159F MED LIST DOCD IN RCRD: CPT | Mod: CPTII,S$GLB,, | Performed by: PODIATRIST

## 2022-11-01 PROCEDURE — 3008F PR BODY MASS INDEX (BMI) DOCUMENTED: ICD-10-PCS | Mod: CPTII,S$GLB,, | Performed by: PODIATRIST

## 2022-11-01 PROCEDURE — 99999 PR PBB SHADOW E&M-EST. PATIENT-LVL III: CPT | Mod: PBBFAC,,, | Performed by: PODIATRIST

## 2022-11-01 PROCEDURE — 3079F DIAST BP 80-89 MM HG: CPT | Mod: CPTII,S$GLB,, | Performed by: PODIATRIST

## 2022-11-01 PROCEDURE — 3079F PR MOST RECENT DIASTOLIC BLOOD PRESSURE 80-89 MM HG: ICD-10-PCS | Mod: CPTII,S$GLB,, | Performed by: PODIATRIST

## 2022-11-01 PROCEDURE — 99999 PR PBB SHADOW E&M-EST. PATIENT-LVL III: ICD-10-PCS | Mod: PBBFAC,,, | Performed by: PODIATRIST

## 2022-11-01 PROCEDURE — 99214 OFFICE O/P EST MOD 30 MIN: CPT | Mod: 25,S$GLB,, | Performed by: PODIATRIST

## 2022-11-01 PROCEDURE — 20550 NJX 1 TENDON SHEATH/LIGAMENT: CPT | Mod: RT,S$GLB,, | Performed by: PODIATRIST

## 2022-11-01 RX ORDER — MELOXICAM 15 MG/1
15 TABLET ORAL DAILY
Qty: 30 TABLET | Refills: 0 | Status: SHIPPED | OUTPATIENT
Start: 2022-11-01 | End: 2023-01-03

## 2022-11-01 RX ORDER — TRIAMCINOLONE ACETONIDE 40 MG/ML
40 INJECTION, SUSPENSION INTRA-ARTICULAR; INTRAMUSCULAR
Status: COMPLETED | OUTPATIENT
Start: 2022-11-01 | End: 2022-11-03

## 2022-11-01 RX ORDER — BENZONATATE 100 MG/1
100-200 CAPSULE ORAL 3 TIMES DAILY PRN
COMMUNITY
Start: 2022-09-30 | End: 2023-05-08

## 2022-11-01 RX ORDER — DEXAMETHASONE SODIUM PHOSPHATE 4 MG/ML
4 INJECTION, SOLUTION INTRA-ARTICULAR; INTRALESIONAL; INTRAMUSCULAR; INTRAVENOUS; SOFT TISSUE
Status: COMPLETED | OUTPATIENT
Start: 2022-11-01 | End: 2022-11-03

## 2022-11-01 RX ORDER — DOXYCYCLINE 100 MG/1
100 CAPSULE ORAL EVERY 12 HOURS
COMMUNITY
Start: 2022-09-30 | End: 2023-05-08

## 2022-11-01 NOTE — PROGRESS NOTES
Subjective:      Patient ID: Evan Sousa is a 27 y.o. female.    Chief Complaint: Follow-up (Bilateral foot pain)    Patient continues to endorse sharp deep pain bottom and back of heels right and left, right >> left.  She notes that with OTC orthotics and intermittent achilles stretching in the morning she has 40% symptom improvement although the pain in the right is still preventing her from being as active as she needs/wants to be.     Review of Systems   Constitutional: Negative for chills, diaphoresis, fever, malaise/fatigue and night sweats.   Cardiovascular:  Negative for claudication, cyanosis, leg swelling and syncope.   Skin:  Negative for color change, dry skin, nail changes, rash, suspicious lesions and unusual hair distribution.   Musculoskeletal:  Negative for falls, joint pain, joint swelling, muscle cramps, muscle weakness and stiffness.   Gastrointestinal:  Negative for constipation, diarrhea, nausea and vomiting.   Neurological:  Negative for brief paralysis, disturbances in coordination, focal weakness, numbness, paresthesias, sensory change and tremors.         Objective:      Physical Exam  Constitutional:       General: She is not in acute distress.     Appearance: She is well-developed. She is not diaphoretic.   Cardiovascular:      Pulses:           Popliteal pulses are 2+ on the right side and 2+ on the left side.        Dorsalis pedis pulses are 2+ on the right side and 2+ on the left side.        Posterior tibial pulses are 2+ on the right side and 2+ on the left side.      Comments: Capillary refill 3 seconds all toes/distal feet, all toes/both feet warm to touch.      Negative lymphadenopathy bilateral popliteal fossa and tarsal tunnel.      Negavie lower extremity edema bilateral.    Musculoskeletal:      Right ankle: No swelling, deformity, ecchymosis or lacerations. Normal range of motion. Normal pulse.      Right Achilles Tendon: Normal. No defects. Davis's test negative.       Comments: Sharp deep pain to palpation inferior heel right and left at medial calcaneal tubercle without ecchymosis, erythema, edema, or cardinal signs infection, and no signs of trauma.    Pain to palpation posterior heel both feet. with visible and palpable hard prominence of bone.  No evidence of trauma or infection present today.     Otherwise, Normal angle, base, station of gait. All ten toes without clubbing, cyanosis, or signs of ischemia.  No pain to palpation bilateral lower extremities.  Range of motion, stability, muscle strength, and muscle tone normal bilateral feet and legs.    Feet:      Comments: Sharp stabbing pain with palpation on of plantar medial calcaneal tubercle R >> L.       Lymphadenopathy:      Lower Body: No right inguinal adenopathy. No left inguinal adenopathy.      Comments: Negative lymphadenopathy bilateral popliteal fossa and tarsal tunnel.    Negative lymphangitic streaking bilateral feet/ankles/legs.   Skin:     General: Skin is warm and dry.      Capillary Refill: Capillary refill takes 2 to 3 seconds.      Coloration: Skin is not pale.      Findings: No abrasion, bruising, burn, ecchymosis, erythema, laceration, lesion or rash.      Nails: There is no clubbing.      Comments: Skin is normal age and health appropriate color, turgor, texture, and temperature bilateral lower extremities without ulceration, hyperpigmentation, discoloration, masses nodules or cords palpated.  No ecchymosis, erythema, edema, or cardinal signs of infection bilateral lower extremities.       Neurological:      Mental Status: She is alert and oriented to person, place, and time.      Sensory: No sensory deficit.      Motor: No tremor, atrophy or abnormal muscle tone.      Gait: Gait normal.      Deep Tendon Reflexes:      Reflex Scores:       Patellar reflexes are 2+ on the right side and 2+ on the left side.       Achilles reflexes are 2+ on the right side and 2+ on the left side.     Comments:  Negative tinel sign to percussion sural, superficial peroneal, deep peroneal, saphenous, and posterior tibial nerves right and left ankles and feet.     Psychiatric:         Behavior: Behavior is cooperative.     Biomechanical Exam:    Non weight bearing assessment:   Right (degrees) Left (degrees)   Malleolar position 18 18   Ankle DF (knee extended) 0 0   Ankle DF (knee flexed) 0 0   Heel Inversion 20 20   Heel Eversion 10 10   STJ Neutral Position 0 0   Forefoot to rearfoot (1-5) Perpendicular Perpendicular   Forefoot to Rearfoot (2-5) Perpendicular Perpendicular   First Ray Dorsiflextion 6 mm 6 mm   First ray plantarflextion 4 mm 4 mm   First ray Neutral Position 0 mm 0 mm    Hallux Dorsiflexion 60 60   Hallux plantarflexion 25 25      Musculoskeletal evaluation, Range of Motion    Normal Limited Excessive pain   Ankle DF  X     Ankle PF X      STJ supination X      STJ pronation X      Hallux DF X      Hallux PF X      Lesser digits DF X      Lesser Digits PF X        Muscle Strength   Right Left   Gastrocnemius 5 5   Soleus 5 5   Tib. Posterior 5 5   FDL 5 5   FHL 5 5   FDB 5 5   Tib Anterior 5 5   EDL 5 5   EHL 5 5   EDB 5 5   Peroneus Longus 5 5   Peroneus Brevis 5 5       Limb length Inequality (in cm)    Right Left   Normal  X X   structural     Combined     Functional       Digital Assessments    Right  1 2 3 4 5    Left  1 2 3 4 5      Abducted                   Adducted                   Clawtoe                   Hammer toe                   Mallet toetoe                   Hallux IP extensus NA NA   Hallux IP abductus NA NA     Forefoot perpendicular to rearfoot     Gait Analysis  Gait Pattern: Normal      Right Left   Angle of Gait 20 20   Base of Gait 9 cm 9 cm       Heel Position Right Left   Contact Inverted Inverted   Mid-stance Neutral Neutral   Propulsion Everted Everted   Swing Neutral Neutral     Heel off: WNL Both  Abductory twist?  No Both            Assessment:       Encounter Diagnoses    Name Primary?    Plantar fasciitis Yes    Foot pain, bilateral     Equinus contracture of ankle          Plan:       Evan was seen today for follow-up.    Diagnoses and all orders for this visit:    Plantar fasciitis    Foot pain, bilateral    Equinus contracture of ankle    I counseled the patient on her conditions, their implications and medical management.    -Patient will continue to stretch the tendo achilles complex three times daily as demonstrated in the office.  Literature was dispensed illustrating proper stretching technique.  -Patient will continue over the counter arch supports and wear them in shoes whenever possible.  Athletic shoes intended for walking or running are usually best.  -The patient was advised that NSAID-type medications have two very important potential side effects: gastrointestinal irritation including hemorrhage and renal injuries. She was asked to take the medication with food and to stop if she experiences any GI upset. I asked her to call for vomiting, abdominal pain or black/bloody stools. The patient expresses understanding of these issues and questions were answered.  -Discussed conservative treatment with shoes of adequate dimensions, material, and style to alleviate symptoms and delay or prevent surgical intervention.  -Steroid injection performed with informed consent, 3 mL, 1.5 : 1.5 of dexamethasone to kenalog  -Physical therapy Rx, to help stretch PF and achilles complex in order to reduce excessive strain on PF from tight posterior calf  -Discussed shoe gear recs  -Rx for Meloxicam sent to patient's pharmacy   -Night splint dispensed to assistant with plantar fascial stretching   -Return to clinic in 6 weeks     : Sridhar Blue DPM PGY 2

## 2022-11-03 RX ADMIN — TRIAMCINOLONE ACETONIDE 40 MG: 40 INJECTION, SUSPENSION INTRA-ARTICULAR; INTRAMUSCULAR at 07:11

## 2022-11-03 RX ADMIN — DEXAMETHASONE SODIUM PHOSPHATE 4 MG: 4 INJECTION, SOLUTION INTRA-ARTICULAR; INTRALESIONAL; INTRAMUSCULAR; INTRAVENOUS; SOFT TISSUE at 07:11

## 2022-12-06 ENCOUNTER — LAB VISIT (OUTPATIENT)
Dept: LAB | Facility: HOSPITAL | Age: 27
End: 2022-12-06
Payer: COMMERCIAL

## 2022-12-06 DIAGNOSIS — E78.2 MIXED HYPERLIPIDEMIA: ICD-10-CM

## 2022-12-06 LAB
CHOLEST SERPL-MCNC: 218 MG/DL (ref 120–199)
CHOLEST/HDLC SERPL: 3.6 {RATIO} (ref 2–5)
HDLC SERPL-MCNC: 61 MG/DL (ref 40–75)
HDLC SERPL: 28 % (ref 20–50)
LDLC SERPL CALC-MCNC: 137 MG/DL (ref 63–159)
NONHDLC SERPL-MCNC: 157 MG/DL
TRIGL SERPL-MCNC: 100 MG/DL (ref 30–150)

## 2022-12-06 PROCEDURE — 36415 COLL VENOUS BLD VENIPUNCTURE: CPT | Performed by: INTERNAL MEDICINE

## 2022-12-06 PROCEDURE — 80061 LIPID PANEL: CPT | Performed by: INTERNAL MEDICINE

## 2022-12-08 ENCOUNTER — OFFICE VISIT (OUTPATIENT)
Dept: INTERNAL MEDICINE | Facility: CLINIC | Age: 27
End: 2022-12-08
Payer: COMMERCIAL

## 2022-12-08 ENCOUNTER — IMMUNIZATION (OUTPATIENT)
Dept: INTERNAL MEDICINE | Facility: CLINIC | Age: 27
End: 2022-12-08
Payer: COMMERCIAL

## 2022-12-08 VITALS
SYSTOLIC BLOOD PRESSURE: 138 MMHG | HEIGHT: 65 IN | DIASTOLIC BLOOD PRESSURE: 80 MMHG | BODY MASS INDEX: 30.3 KG/M2 | HEART RATE: 72 BPM | WEIGHT: 181.88 LBS | OXYGEN SATURATION: 89 %

## 2022-12-08 DIAGNOSIS — E78.2 MIXED HYPERLIPIDEMIA: Primary | ICD-10-CM

## 2022-12-08 PROBLEM — M72.2 PLANTAR FASCIITIS: Status: ACTIVE | Noted: 2022-12-08

## 2022-12-08 PROCEDURE — 3075F SYST BP GE 130 - 139MM HG: CPT | Mod: CPTII,S$GLB,, | Performed by: INTERNAL MEDICINE

## 2022-12-08 PROCEDURE — 3079F DIAST BP 80-89 MM HG: CPT | Mod: CPTII,S$GLB,, | Performed by: INTERNAL MEDICINE

## 2022-12-08 PROCEDURE — 3079F PR MOST RECENT DIASTOLIC BLOOD PRESSURE 80-89 MM HG: ICD-10-PCS | Mod: CPTII,S$GLB,, | Performed by: INTERNAL MEDICINE

## 2022-12-08 PROCEDURE — 99212 PR OFFICE/OUTPT VISIT, EST, LEVL II, 10-19 MIN: ICD-10-PCS | Mod: 25,S$GLB,, | Performed by: INTERNAL MEDICINE

## 2022-12-08 PROCEDURE — 90686 IIV4 VACC NO PRSV 0.5 ML IM: CPT | Mod: S$GLB,,, | Performed by: INTERNAL MEDICINE

## 2022-12-08 PROCEDURE — 99999 PR PBB SHADOW E&M-EST. PATIENT-LVL III: ICD-10-PCS | Mod: PBBFAC,,, | Performed by: INTERNAL MEDICINE

## 2022-12-08 PROCEDURE — 99999 PR PBB SHADOW E&M-EST. PATIENT-LVL III: CPT | Mod: PBBFAC,,, | Performed by: INTERNAL MEDICINE

## 2022-12-08 PROCEDURE — 90471 FLU VACCINE (QUAD) GREATER THAN OR EQUAL TO 3YO PRESERVATIVE FREE IM: ICD-10-PCS | Mod: S$GLB,,, | Performed by: INTERNAL MEDICINE

## 2022-12-08 PROCEDURE — 90471 IMMUNIZATION ADMIN: CPT | Mod: S$GLB,,, | Performed by: INTERNAL MEDICINE

## 2022-12-08 PROCEDURE — 3008F PR BODY MASS INDEX (BMI) DOCUMENTED: ICD-10-PCS | Mod: CPTII,S$GLB,, | Performed by: INTERNAL MEDICINE

## 2022-12-08 PROCEDURE — 3044F HG A1C LEVEL LT 7.0%: CPT | Mod: CPTII,S$GLB,, | Performed by: INTERNAL MEDICINE

## 2022-12-08 PROCEDURE — 90686 FLU VACCINE (QUAD) GREATER THAN OR EQUAL TO 3YO PRESERVATIVE FREE IM: ICD-10-PCS | Mod: S$GLB,,, | Performed by: INTERNAL MEDICINE

## 2022-12-08 PROCEDURE — 3008F BODY MASS INDEX DOCD: CPT | Mod: CPTII,S$GLB,, | Performed by: INTERNAL MEDICINE

## 2022-12-08 PROCEDURE — 3044F PR MOST RECENT HEMOGLOBIN A1C LEVEL <7.0%: ICD-10-PCS | Mod: CPTII,S$GLB,, | Performed by: INTERNAL MEDICINE

## 2022-12-08 PROCEDURE — 99212 OFFICE O/P EST SF 10 MIN: CPT | Mod: 25,S$GLB,, | Performed by: INTERNAL MEDICINE

## 2022-12-08 PROCEDURE — 3075F PR MOST RECENT SYSTOLIC BLOOD PRESS GE 130-139MM HG: ICD-10-PCS | Mod: CPTII,S$GLB,, | Performed by: INTERNAL MEDICINE

## 2022-12-08 NOTE — PROGRESS NOTES
Subjective:       Patient ID: Evan Sousa is a 27 y.o. female.    Chief Complaint: Follow-up    HPI    Ms. Sousa is a 26 yo female who presents for follow up.     During last visit we discussed elevated cholesterol with her LDL being 184. She has been working on her diet since and taking omega 3 along with fiber supplements. Repeat labs done last week show LDL down to 137.          PMHx:  Plantar Fascitis: seeing podiatry. Had steroid injection.   HLD: as above.     Health Maintenance:    HIV: negative 2022   Hep C: negative 2022  Lipids: improved on recent labs    Pap Smear: Done March 2021 and was normal. Due in 2024.   Vaccines: flu shot today       Review of Systems   Constitutional:  Negative for activity change, appetite change and chills.   HENT:  Negative for ear pain, sinus pressure/congestion and sneezing.    Respiratory:  Negative for cough and shortness of breath.    Cardiovascular:  Negative for chest pain, palpitations and leg swelling.   Gastrointestinal:  Negative for abdominal distention, abdominal pain, constipation, diarrhea, nausea and vomiting.   Genitourinary:  Negative for dysuria and hematuria.   Musculoskeletal:  Negative for arthralgias, back pain and myalgias.   Neurological:  Negative for dizziness and headaches.   Psychiatric/Behavioral:  Negative for agitation. The patient is not nervous/anxious.          Past Medical History:   Diagnosis Date    COVID-19     2020     No past surgical history on file.   Patient Active Problem List   Diagnosis    Near syncope    Reactive hypoglycemia    Mixed hyperlipidemia    Plantar fasciitis        Objective:      Physical Exam  Constitutional:       Appearance: Normal appearance.   HENT:      Head: Normocephalic.      Right Ear: Tympanic membrane normal.      Left Ear: Tympanic membrane normal.      Nose: Nose normal.   Cardiovascular:      Rate and Rhythm: Normal rate and regular rhythm.      Pulses: Normal pulses.      Heart sounds:  Normal heart sounds.   Pulmonary:      Effort: Pulmonary effort is normal.      Breath sounds: Normal breath sounds.   Abdominal:      General: Abdomen is flat. Bowel sounds are normal.      Palpations: Abdomen is soft.   Musculoskeletal:         General: Normal range of motion.      Cervical back: Normal range of motion and neck supple.   Skin:     General: Skin is warm and dry.   Neurological:      General: No focal deficit present.      Mental Status: She is alert and oriented to person, place, and time.   Psychiatric:         Mood and Affect: Mood normal.       Assessment:       Problem List Items Addressed This Visit          Cardiac/Vascular    Mixed hyperlipidemia - Primary    Relevant Orders    LIPID PANEL       Orthopedic    Plantar fasciitis       Plan:         Evan was seen today for follow-up.    Diagnoses and all orders for this visit:    Mixed hyperlipidemia  During last visit we discussed elevated cholesterol with her LDL being 184. She has been working on her diet since and taking omega 3 along with fiber supplements. Repeat labs done last week show LDL down to 137.  -  Repeat in 6 months with annual exam     Follow up in 6 months for annual exam           Maria T Sierra MD   Internal Medicine   Primary Care

## 2023-01-03 ENCOUNTER — OFFICE VISIT (OUTPATIENT)
Dept: PODIATRY | Facility: CLINIC | Age: 28
End: 2023-01-03
Payer: COMMERCIAL

## 2023-01-03 VITALS
DIASTOLIC BLOOD PRESSURE: 82 MMHG | HEIGHT: 65 IN | WEIGHT: 181 LBS | BODY MASS INDEX: 30.16 KG/M2 | SYSTOLIC BLOOD PRESSURE: 125 MMHG | HEART RATE: 80 BPM

## 2023-01-03 DIAGNOSIS — M77.9 ENTHESOPATHY: ICD-10-CM

## 2023-01-03 DIAGNOSIS — M72.2 PLANTAR FASCIITIS: Primary | ICD-10-CM

## 2023-01-03 DIAGNOSIS — M24.573 EQUINUS CONTRACTURE OF ANKLE: ICD-10-CM

## 2023-01-03 DIAGNOSIS — M79.671 FOOT PAIN, BILATERAL: ICD-10-CM

## 2023-01-03 DIAGNOSIS — M79.672 FOOT PAIN, BILATERAL: ICD-10-CM

## 2023-01-03 PROCEDURE — 1159F PR MEDICATION LIST DOCUMENTED IN MEDICAL RECORD: ICD-10-PCS | Mod: CPTII,S$GLB,, | Performed by: PODIATRIST

## 2023-01-03 PROCEDURE — 3008F PR BODY MASS INDEX (BMI) DOCUMENTED: ICD-10-PCS | Mod: CPTII,S$GLB,, | Performed by: PODIATRIST

## 2023-01-03 PROCEDURE — 1160F PR REVIEW ALL MEDS BY PRESCRIBER/CLIN PHARMACIST DOCUMENTED: ICD-10-PCS | Mod: CPTII,S$GLB,, | Performed by: PODIATRIST

## 2023-01-03 PROCEDURE — 3074F PR MOST RECENT SYSTOLIC BLOOD PRESSURE < 130 MM HG: ICD-10-PCS | Mod: CPTII,S$GLB,, | Performed by: PODIATRIST

## 2023-01-03 PROCEDURE — 3079F DIAST BP 80-89 MM HG: CPT | Mod: CPTII,S$GLB,, | Performed by: PODIATRIST

## 2023-01-03 PROCEDURE — 1159F MED LIST DOCD IN RCRD: CPT | Mod: CPTII,S$GLB,, | Performed by: PODIATRIST

## 2023-01-03 PROCEDURE — 3079F PR MOST RECENT DIASTOLIC BLOOD PRESSURE 80-89 MM HG: ICD-10-PCS | Mod: CPTII,S$GLB,, | Performed by: PODIATRIST

## 2023-01-03 PROCEDURE — 99213 OFFICE O/P EST LOW 20 MIN: CPT | Mod: S$GLB,,, | Performed by: PODIATRIST

## 2023-01-03 PROCEDURE — 3008F BODY MASS INDEX DOCD: CPT | Mod: CPTII,S$GLB,, | Performed by: PODIATRIST

## 2023-01-03 PROCEDURE — 99999 PR PBB SHADOW E&M-EST. PATIENT-LVL III: CPT | Mod: PBBFAC,,, | Performed by: PODIATRIST

## 2023-01-03 PROCEDURE — 99213 PR OFFICE/OUTPT VISIT, EST, LEVL III, 20-29 MIN: ICD-10-PCS | Mod: S$GLB,,, | Performed by: PODIATRIST

## 2023-01-03 PROCEDURE — 1160F RVW MEDS BY RX/DR IN RCRD: CPT | Mod: CPTII,S$GLB,, | Performed by: PODIATRIST

## 2023-01-03 PROCEDURE — 99999 PR PBB SHADOW E&M-EST. PATIENT-LVL III: ICD-10-PCS | Mod: PBBFAC,,, | Performed by: PODIATRIST

## 2023-01-03 PROCEDURE — 3074F SYST BP LT 130 MM HG: CPT | Mod: CPTII,S$GLB,, | Performed by: PODIATRIST

## 2023-01-03 RX ORDER — MELOXICAM 15 MG/1
15 TABLET ORAL DAILY
Qty: 30 TABLET | Refills: 0 | Status: SHIPPED | OUTPATIENT
Start: 2023-01-03 | End: 2023-03-23

## 2023-01-03 RX ORDER — AZITHROMYCIN 250 MG/1
TABLET, FILM COATED ORAL
COMMUNITY
Start: 2022-12-18 | End: 2023-05-08

## 2023-01-03 RX ORDER — BROMPHENIRAMINE MALEATE, PSEUDOEPHEDRINE HYDROCHLORIDE, AND DEXTROMETHORPHAN HYDROBROMIDE 2; 30; 10 MG/5ML; MG/5ML; MG/5ML
10 SYRUP ORAL EVERY 6 HOURS PRN
COMMUNITY
Start: 2022-12-18 | End: 2023-05-08

## 2023-01-03 NOTE — PROGRESS NOTES
Subjective:      Patient ID: Evan Sousa is a 27 y.o. female.    Chief Complaint: Follow-up (Foot Pain)    Sharp deep pain in the bottoms in the backs of both heels.  Marked improvement on the left with less improvement on the right.  Most significant remaining symptoms sharp deep pain in the bottom of the right heel.  He has been gradual onset, gradually improving over the past 10-12 weeks or so.  Aggravated by increased weight-bearing particularly after rest.  Prior medical treatments of night splint, p.r.n. NSAIDs, steroid injection have helped.  She is not yet filled custom orthotic prescription or tendon physical therapy.          Review of Systems   Constitutional: Negative for chills, diaphoresis, fever, malaise/fatigue and night sweats.   Cardiovascular:  Negative for claudication, cyanosis, leg swelling and syncope.   Skin:  Negative for color change, dry skin, nail changes, rash, suspicious lesions and unusual hair distribution.   Musculoskeletal:  Negative for falls, joint pain, joint swelling, muscle cramps, muscle weakness and stiffness.   Gastrointestinal:  Negative for constipation, diarrhea, nausea and vomiting.   Neurological:  Negative for brief paralysis, disturbances in coordination, focal weakness, numbness, paresthesias, sensory change and tremors.         Objective:      Physical Exam  Constitutional:       General: She is not in acute distress.     Appearance: She is well-developed. She is not diaphoretic.   Cardiovascular:      Pulses:           Popliteal pulses are 2+ on the right side and 2+ on the left side.        Dorsalis pedis pulses are 2+ on the right side and 2+ on the left side.        Posterior tibial pulses are 2+ on the right side and 2+ on the left side.      Comments: Capillary refill 3 seconds all toes/distal feet, all toes/both feet warm to touch.      Negative lymphadenopathy bilateral popliteal fossa and tarsal tunnel.      Negavie lower extremity edema  bilateral.    Musculoskeletal:      Right ankle: No swelling, deformity, ecchymosis or lacerations. Normal range of motion. Normal pulse.      Right Achilles Tendon: Normal. No defects. Davis's test negative.      Comments: Sharp deep pain to palpation inferior heel right at medial calcaneal tubercle without ecchymosis, erythema, edema, or cardinal signs infection, and no signs of trauma.    Ankle dorsiflexion decreased at <10 degrees bilateral with moderate increase with knee flexion bilateral.    Otherwise, Normal angle, base, station of gait. All ten toes without clubbing, cyanosis, or signs of ischemia.  No pain to palpation bilateral lower extremities.  Range of motion, stability, muscle strength, and muscle tone normal bilateral feet and legs.    Lymphadenopathy:      Lower Body: No right inguinal adenopathy. No left inguinal adenopathy.      Comments: Negative lymphadenopathy bilateral popliteal fossa and tarsal tunnel.    Negative lymphangitic streaking bilateral feet/ankles/legs.   Skin:     General: Skin is warm and dry.      Capillary Refill: Capillary refill takes 2 to 3 seconds.      Coloration: Skin is not pale.      Findings: No abrasion, bruising, burn, ecchymosis, erythema, laceration, lesion or rash.      Nails: There is no clubbing.      Comments: Skin is normal age and health appropriate color, turgor, texture, and temperature bilateral lower extremities without ulceration, hyperpigmentation, discoloration, masses nodules or cords palpated.  No ecchymosis, erythema, edema, or cardinal signs of infection bilateral lower extremities.     Neurological:      Mental Status: She is alert and oriented to person, place, and time.      Sensory: No sensory deficit.      Motor: No tremor, atrophy or abnormal muscle tone.      Gait: Gait normal.      Deep Tendon Reflexes:      Reflex Scores:       Patellar reflexes are 2+ on the right side and 2+ on the left side.       Achilles reflexes are 2+ on the  right side and 2+ on the left side.     Comments: Negative tinel sign to percussion sural, superficial peroneal, deep peroneal, saphenous, and posterior tibial nerves right and left ankles and feet.     Psychiatric:         Behavior: Behavior is cooperative.           Assessment:       Encounter Diagnoses   Name Primary?    Plantar fasciitis Yes    Foot pain, bilateral     Equinus contracture of ankle     Enthesopathy          Plan:       Evan was seen today for follow-up.    Diagnoses and all orders for this visit:    Plantar fasciitis    Foot pain, bilateral    Equinus contracture of ankle    Enthesopathy    Other orders  -     meloxicam (MOBIC) 15 MG tablet; Take 1 tablet (15 mg total) by mouth once daily.      I counseled the patient on her conditions, their implications and medical management.        Continue stretches, inserts, athletic shoes, activity to tolerance, night splint when she can.      Refill meloxicam for p.r.n. use only.  Verified again not pregnant not nursing not trying.      P.r.n..          Follow up if symptoms worsen or fail to improve.

## 2023-02-24 ENCOUNTER — OFFICE VISIT (OUTPATIENT)
Dept: OBSTETRICS AND GYNECOLOGY | Facility: CLINIC | Age: 28
End: 2023-02-24
Payer: COMMERCIAL

## 2023-02-24 VITALS
SYSTOLIC BLOOD PRESSURE: 120 MMHG | WEIGHT: 183.44 LBS | HEIGHT: 65 IN | BODY MASS INDEX: 30.56 KG/M2 | DIASTOLIC BLOOD PRESSURE: 82 MMHG

## 2023-02-24 DIAGNOSIS — Z01.419 ENCOUNTER FOR ANNUAL ROUTINE GYNECOLOGICAL EXAMINATION: Primary | ICD-10-CM

## 2023-02-24 PROCEDURE — 3079F PR MOST RECENT DIASTOLIC BLOOD PRESSURE 80-89 MM HG: ICD-10-PCS | Mod: CPTII,S$GLB,, | Performed by: OBSTETRICS & GYNECOLOGY

## 2023-02-24 PROCEDURE — 1159F MED LIST DOCD IN RCRD: CPT | Mod: CPTII,S$GLB,, | Performed by: OBSTETRICS & GYNECOLOGY

## 2023-02-24 PROCEDURE — 99395 PREV VISIT EST AGE 18-39: CPT | Mod: S$GLB,,, | Performed by: OBSTETRICS & GYNECOLOGY

## 2023-02-24 PROCEDURE — 1160F RVW MEDS BY RX/DR IN RCRD: CPT | Mod: CPTII,S$GLB,, | Performed by: OBSTETRICS & GYNECOLOGY

## 2023-02-24 PROCEDURE — 1159F PR MEDICATION LIST DOCUMENTED IN MEDICAL RECORD: ICD-10-PCS | Mod: CPTII,S$GLB,, | Performed by: OBSTETRICS & GYNECOLOGY

## 2023-02-24 PROCEDURE — 99999 PR PBB SHADOW E&M-EST. PATIENT-LVL III: CPT | Mod: PBBFAC,,, | Performed by: OBSTETRICS & GYNECOLOGY

## 2023-02-24 PROCEDURE — 99395 PR PREVENTIVE VISIT,EST,18-39: ICD-10-PCS | Mod: S$GLB,,, | Performed by: OBSTETRICS & GYNECOLOGY

## 2023-02-24 PROCEDURE — 3008F PR BODY MASS INDEX (BMI) DOCUMENTED: ICD-10-PCS | Mod: CPTII,S$GLB,, | Performed by: OBSTETRICS & GYNECOLOGY

## 2023-02-24 PROCEDURE — 3079F DIAST BP 80-89 MM HG: CPT | Mod: CPTII,S$GLB,, | Performed by: OBSTETRICS & GYNECOLOGY

## 2023-02-24 PROCEDURE — 99999 PR PBB SHADOW E&M-EST. PATIENT-LVL III: ICD-10-PCS | Mod: PBBFAC,,, | Performed by: OBSTETRICS & GYNECOLOGY

## 2023-02-24 PROCEDURE — 3008F BODY MASS INDEX DOCD: CPT | Mod: CPTII,S$GLB,, | Performed by: OBSTETRICS & GYNECOLOGY

## 2023-02-24 PROCEDURE — 3074F SYST BP LT 130 MM HG: CPT | Mod: CPTII,S$GLB,, | Performed by: OBSTETRICS & GYNECOLOGY

## 2023-02-24 PROCEDURE — 1160F PR REVIEW ALL MEDS BY PRESCRIBER/CLIN PHARMACIST DOCUMENTED: ICD-10-PCS | Mod: CPTII,S$GLB,, | Performed by: OBSTETRICS & GYNECOLOGY

## 2023-02-24 PROCEDURE — 3074F PR MOST RECENT SYSTOLIC BLOOD PRESSURE < 130 MM HG: ICD-10-PCS | Mod: CPTII,S$GLB,, | Performed by: OBSTETRICS & GYNECOLOGY

## 2023-02-24 RX ORDER — LEVONORGESTREL AND ETHINYL ESTRADIOL 0.15-0.03
1 KIT ORAL DAILY
Qty: 90 TABLET | Refills: 3 | Status: SHIPPED | OUTPATIENT
Start: 2023-02-24 | End: 2024-02-24

## 2023-02-24 NOTE — PROGRESS NOTES
SUBJECTIVE:     Chief Complaint: Well Woman       History of Present Illness:  Annual Exam  Patient presents for annual exam.   She c/o nothing today.  She denies any vd, vb, dyspareunia, dysuria, depression, anxiety.  Last pap was in  and was neg. HPV na.  Birth Control: seasnoale, wants to continue  declines STD testing.   Been with one partner x 8 years.       GYN screening history: denies  Mammogram history: na  Colonoscopy history: na  Dexa history: na     FH:   Breast cancer: maternal GM  Colon cancer: none  Ovarian cancer: none    Review of patient's allergies indicates:  No Known Allergies    Past Medical History:   Diagnosis Date    COVID-19     2020     History reviewed. No pertinent surgical history.  OB History          0    Para   0    Term   0       0    AB   0    Living   0         SAB   0    IAB   0    Ectopic   0    Multiple   0    Live Births                   Family History   Problem Relation Age of Onset    Hypertension Mother     Breast cancer Maternal Grandmother 70        Dec'd    No Known Problems Maternal Grandfather     Diabetes Paternal Grandfather     Stroke Paternal Grandfather     Ovarian cancer Neg Hx     Colon cancer Neg Hx      Social History     Tobacco Use    Smoking status: Never    Smokeless tobacco: Never   Substance Use Topics    Alcohol use: Yes     Alcohol/week: 0.0 standard drinks     Comment: socially    Drug use: No       Current Outpatient Medications   Medication Sig    meloxicam (MOBIC) 15 MG tablet Take 1 tablet (15 mg total) by mouth once daily.    azithromycin (Z-ADELITA) 250 MG tablet TAKE 2 TABLETS BY MOUTH TODAY, THEN TAKE 1 TABLET DAILY FOR 4 DAYS    benzonatate (TESSALON) 100 MG capsule Take 100-200 mg by mouth 3 (three) times daily as needed.    brompheniramine-pseudoeph-DM (BROMFED DM) 2-30-10 mg/5 mL Syrp Take 10 mLs by mouth every 6 (six) hours as needed.    doxycycline (VIBRAMYCIN) 100 MG Cap Take 100 mg by mouth every 12 (twelve) hours.     levonorgestrel-ethinyl estradiol (SEASONALE) 0.15 mg-30 mcg (91) per tablet Take 1 tablet by mouth once daily.     No current facility-administered medications for this visit.       Review of Systems:  GENERAL: No fever, chills, fatigability or weight loss.  CARDIOVASCULAR: No chest pain. No palpitations.  RESPIRATORY: No SOB, no wheezing.  BREAST: Denies pain. No lumps. No discharge.  VULVAR: No pain, no lesions and no itching.  VAGINAL: No relaxation, no itching, no discharge, no abnormal bleeding and no lesions.  ABDOMEN: No abdominal pain. Denies nausea. Denies vomiting. No diarrhea. No constipation  URINARY: No incontinence, no nocturia, no frequency and no dysuria.  NEUROLOGICAL: No headaches. No vision changes.       OBJECTIVE:     Vitals:    02/24/23 1049   BP: 120/82       Patient verbally consents to pelvic and breast exams.  Physical Exam:  Gen: NAD, well developed, well-nourished  HEENT: Normocephalic, atraumatic  Eyes: EOM nl, conjuntivae normal  Neck: ROM normal, no thyromegaly  Respiratory: Effort normal   Abd: soft, nontender, no masses palpated  Breast: Normal bilaterally, no masses, lesions or tenderness. No nipple discharge on expression, no lymphadenopathy bilaterally.  SSE:  Vulva: no lesions or rashes  Cervix: No lesions noted, nonfriable, no vaginal discharge or vaginal bleeding noted  BME:   Cervix: No CMT  Adnexa: nl bilaterally, no masses or fullness palpated  Uterus: normal, nonenlarged  Musculoskeletal: normal ROM  Neuro: alert, AAOx3  Skin: warm and dry  Psych: mood/affect nl, behavior normal, judgement normal, thought content normal        ASSESSMENT:       ICD-10-CM ICD-9-CM    1. Encounter for annual routine gynecological examination  Z01.419 V72.31              Plan:      Evan was seen today for well woman.    Diagnoses and all orders for this visit:    Encounter for annual routine gynecological examination    Other orders  -     levonorgestrel-ethinyl estradiol (SEASONALE) 0.15  mg-30 mcg (91) per tablet; Take 1 tablet by mouth once daily.        No orders of the defined types were placed in this encounter.      Follow up in one year for annual, or prn.    Julie R Jeansonne

## 2023-03-23 ENCOUNTER — OFFICE VISIT (OUTPATIENT)
Dept: PODIATRY | Facility: CLINIC | Age: 28
End: 2023-03-23
Payer: COMMERCIAL

## 2023-03-23 VITALS
HEIGHT: 65 IN | HEART RATE: 81 BPM | BODY MASS INDEX: 30.49 KG/M2 | DIASTOLIC BLOOD PRESSURE: 78 MMHG | WEIGHT: 183 LBS | SYSTOLIC BLOOD PRESSURE: 136 MMHG

## 2023-03-23 DIAGNOSIS — M79.671 FOOT PAIN, BILATERAL: ICD-10-CM

## 2023-03-23 DIAGNOSIS — M72.2 PLANTAR FASCIITIS: Primary | ICD-10-CM

## 2023-03-23 DIAGNOSIS — M79.672 FOOT PAIN, BILATERAL: ICD-10-CM

## 2023-03-23 DIAGNOSIS — M77.9 ENTHESOPATHY: ICD-10-CM

## 2023-03-23 DIAGNOSIS — M24.573 EQUINUS CONTRACTURE OF ANKLE: ICD-10-CM

## 2023-03-23 PROCEDURE — 3008F BODY MASS INDEX DOCD: CPT | Mod: CPTII,S$GLB,, | Performed by: PODIATRIST

## 2023-03-23 PROCEDURE — 3075F SYST BP GE 130 - 139MM HG: CPT | Mod: CPTII,S$GLB,, | Performed by: PODIATRIST

## 2023-03-23 PROCEDURE — 20550 PR INJECT TENDON SHEATH/LIGAMENT: ICD-10-PCS | Mod: 50,S$GLB,, | Performed by: PODIATRIST

## 2023-03-23 PROCEDURE — 1159F MED LIST DOCD IN RCRD: CPT | Mod: CPTII,S$GLB,, | Performed by: PODIATRIST

## 2023-03-23 PROCEDURE — 3078F PR MOST RECENT DIASTOLIC BLOOD PRESSURE < 80 MM HG: ICD-10-PCS | Mod: CPTII,S$GLB,, | Performed by: PODIATRIST

## 2023-03-23 PROCEDURE — 99999 PR PBB SHADOW E&M-EST. PATIENT-LVL III: ICD-10-PCS | Mod: PBBFAC,,, | Performed by: PODIATRIST

## 2023-03-23 PROCEDURE — 99214 OFFICE O/P EST MOD 30 MIN: CPT | Mod: 25,S$GLB,, | Performed by: PODIATRIST

## 2023-03-23 PROCEDURE — 3078F DIAST BP <80 MM HG: CPT | Mod: CPTII,S$GLB,, | Performed by: PODIATRIST

## 2023-03-23 PROCEDURE — 99214 PR OFFICE/OUTPT VISIT, EST, LEVL IV, 30-39 MIN: ICD-10-PCS | Mod: 25,S$GLB,, | Performed by: PODIATRIST

## 2023-03-23 PROCEDURE — 1159F PR MEDICATION LIST DOCUMENTED IN MEDICAL RECORD: ICD-10-PCS | Mod: CPTII,S$GLB,, | Performed by: PODIATRIST

## 2023-03-23 PROCEDURE — 3075F PR MOST RECENT SYSTOLIC BLOOD PRESS GE 130-139MM HG: ICD-10-PCS | Mod: CPTII,S$GLB,, | Performed by: PODIATRIST

## 2023-03-23 PROCEDURE — 3008F PR BODY MASS INDEX (BMI) DOCUMENTED: ICD-10-PCS | Mod: CPTII,S$GLB,, | Performed by: PODIATRIST

## 2023-03-23 PROCEDURE — 99999 PR PBB SHADOW E&M-EST. PATIENT-LVL III: CPT | Mod: PBBFAC,,, | Performed by: PODIATRIST

## 2023-03-23 PROCEDURE — 20550 NJX 1 TENDON SHEATH/LIGAMENT: CPT | Mod: 50,S$GLB,, | Performed by: PODIATRIST

## 2023-03-23 RX ORDER — MELOXICAM 15 MG/1
15 TABLET ORAL DAILY
Qty: 30 TABLET | Refills: 0 | Status: SHIPPED | OUTPATIENT
Start: 2023-03-23

## 2023-03-23 RX ORDER — TRIAMCINOLONE ACETONIDE 40 MG/ML
40 INJECTION, SUSPENSION INTRA-ARTICULAR; INTRAMUSCULAR
Status: COMPLETED | OUTPATIENT
Start: 2023-03-23 | End: 2023-03-23

## 2023-03-23 RX ORDER — DEXAMETHASONE SODIUM PHOSPHATE 4 MG/ML
4 INJECTION, SOLUTION INTRA-ARTICULAR; INTRALESIONAL; INTRAMUSCULAR; INTRAVENOUS; SOFT TISSUE
Status: COMPLETED | OUTPATIENT
Start: 2023-03-23 | End: 2023-03-23

## 2023-03-23 RX ADMIN — DEXAMETHASONE SODIUM PHOSPHATE 4 MG: 4 INJECTION, SOLUTION INTRA-ARTICULAR; INTRALESIONAL; INTRAMUSCULAR; INTRAVENOUS; SOFT TISSUE at 08:03

## 2023-03-23 RX ADMIN — TRIAMCINOLONE ACETONIDE 40 MG: 40 INJECTION, SUSPENSION INTRA-ARTICULAR; INTRAMUSCULAR at 08:03

## 2023-03-23 NOTE — PROGRESS NOTES
Subjective:      Patient ID: Evan Sousa is a 28 y.o. female.    Chief Complaint: Foot Pain (Address feet pain)    Sharp deep pain in the bottoms in the backs of both heels.  Marked improvement on the left with less improvement on the right.  Most significant remaining symptoms sharp deep pain in the bottom of the right heel.  He has been gradual onset, gradually improving over the past 10-12 weeks or so.  Aggravated by increased weight-bearing particularly after rest.  Prior medical treatments of night splint, p.r.n. NSAIDs, steroid injection have helped.  She is not yet filled custom orthotic prescription or tendon physical therapy.          Review of Systems   Constitutional: Negative for chills, diaphoresis, fever, malaise/fatigue and night sweats.   Cardiovascular:  Negative for claudication, cyanosis, leg swelling and syncope.   Skin:  Negative for color change, dry skin, nail changes, rash, suspicious lesions and unusual hair distribution.   Musculoskeletal:  Negative for falls, joint pain, joint swelling, muscle cramps, muscle weakness and stiffness.   Gastrointestinal:  Negative for constipation, diarrhea, nausea and vomiting.   Neurological:  Negative for brief paralysis, disturbances in coordination, focal weakness, numbness, paresthesias, sensory change and tremors.         Objective:      Physical Exam  Constitutional:       General: She is not in acute distress.     Appearance: She is well-developed. She is not diaphoretic.   Cardiovascular:      Pulses:           Popliteal pulses are 2+ on the right side and 2+ on the left side.        Dorsalis pedis pulses are 2+ on the right side and 2+ on the left side.        Posterior tibial pulses are 2+ on the right side and 2+ on the left side.      Comments: Capillary refill 3 seconds all toes/distal feet, all toes/both feet warm to touch.      Negative lymphadenopathy bilateral popliteal fossa and tarsal tunnel.      Negavie lower extremity edema  bilateral.    Musculoskeletal:      Right ankle: No swelling, deformity, ecchymosis or lacerations. Normal range of motion. Normal pulse.      Right Achilles Tendon: Normal. No defects. Davis's test negative.      Comments: Sharp deep pain to palpation inferior heel right at medial calcaneal tubercle without ecchymosis, erythema, edema, or cardinal signs infection, and no signs of trauma.    Ankle dorsiflexion decreased at <10 degrees bilateral with moderate increase with knee flexion bilateral.    Otherwise, Normal angle, base, station of gait. All ten toes without clubbing, cyanosis, or signs of ischemia.  No pain to palpation bilateral lower extremities.  Range of motion, stability, muscle strength, and muscle tone normal bilateral feet and legs.    Lymphadenopathy:      Lower Body: No right inguinal adenopathy. No left inguinal adenopathy.      Comments: Negative lymphadenopathy bilateral popliteal fossa and tarsal tunnel.    Negative lymphangitic streaking bilateral feet/ankles/legs.   Skin:     General: Skin is warm and dry.      Capillary Refill: Capillary refill takes 2 to 3 seconds.      Coloration: Skin is not pale.      Findings: No abrasion, bruising, burn, ecchymosis, erythema, laceration, lesion or rash.      Nails: There is no clubbing.      Comments: Skin is normal age and health appropriate color, turgor, texture, and temperature bilateral lower extremities without ulceration, hyperpigmentation, discoloration, masses nodules or cords palpated.  No ecchymosis, erythema, edema, or cardinal signs of infection bilateral lower extremities.     Neurological:      Mental Status: She is alert and oriented to person, place, and time.      Sensory: No sensory deficit.      Motor: No tremor, atrophy or abnormal muscle tone.      Gait: Gait normal.      Deep Tendon Reflexes:      Reflex Scores:       Patellar reflexes are 2+ on the right side and 2+ on the left side.       Achilles reflexes are 2+ on the  right side and 2+ on the left side.     Comments: Negative tinel sign to percussion sural, superficial peroneal, deep peroneal, saphenous, and posterior tibial nerves right and left ankles and feet.     Psychiatric:         Behavior: Behavior is cooperative.           Assessment:       Encounter Diagnoses   Name Primary?    Plantar fasciitis Yes    Foot pain, bilateral     Equinus contracture of ankle     Enthesopathy          Plan:       Evan was seen today for foot pain.    Diagnoses and all orders for this visit:    Plantar fasciitis  -     Ambulatory referral/consult to Physical/Occupational Therapy; Future  -     ORTHOTIC DEVICE (DME)    Foot pain, bilateral  -     Ambulatory referral/consult to Physical/Occupational Therapy; Future  -     ORTHOTIC DEVICE (DME)    Equinus contracture of ankle  -     Ambulatory referral/consult to Physical/Occupational Therapy; Future  -     ORTHOTIC DEVICE (DME)    Enthesopathy  -     ORTHOTIC DEVICE (DME)    Other orders  -     meloxicam (MOBIC) 15 MG tablet; Take 1 tablet (15 mg total) by mouth once daily.  -     dexAMETHasone injection 4 mg  -     triamcinolone acetonide injection 40 mg    I counseled the patient on her conditions, their implications and medical management.    Rx custom orthotics, PT    Continue stretches, inserts, athletic shoes, activity to tolerance, night splint when she can.      Refill meloxicam for p.r.n. use only.  Verified again not pregnant not nursing not trying.      P.r.n..      Counseled the patient on the potential complications of local injection of corticosteroid including, but not limited to:  Discoloration of skin, erosion of soft tissue, and increased likelihood of rupture of a soft tissue structure (ie. Plantar fascia, muscle, tendon, ligament, or capsule in the area of injection).  Patient indicates understanding of my explanation, that any questions have been answered to patient satisfaction, and patient gives verbal consent for  injection of affected area.    After sterile skin prep, steroid injection was performed with patient verbal consent and timeout procedure with patient identifiers, site markings, and procedures in agreement to all present, at right and left plantar fascia using 20 mg of Kenalog, 4mg dexamethazone sodium phosphate, and 1mL 1% Lidocaine plain. This was well tolerated.      No follow-ups on file.

## 2023-03-28 ENCOUNTER — CLINICAL SUPPORT (OUTPATIENT)
Dept: REHABILITATION | Facility: OTHER | Age: 28
End: 2023-03-28
Attending: PODIATRIST
Payer: COMMERCIAL

## 2023-03-28 DIAGNOSIS — M24.573 EQUINUS CONTRACTURE OF ANKLE: ICD-10-CM

## 2023-03-28 DIAGNOSIS — M25.672 DECREASED RANGE OF MOTION OF BOTH ANKLES: ICD-10-CM

## 2023-03-28 DIAGNOSIS — M72.2 PLANTAR FASCIITIS: ICD-10-CM

## 2023-03-28 DIAGNOSIS — M25.671 DECREASED RANGE OF MOTION OF BOTH ANKLES: ICD-10-CM

## 2023-03-28 DIAGNOSIS — M79.671 FOOT PAIN, BILATERAL: ICD-10-CM

## 2023-03-28 DIAGNOSIS — M79.672 FOOT PAIN, BILATERAL: ICD-10-CM

## 2023-03-28 PROCEDURE — 97110 THERAPEUTIC EXERCISES: CPT | Mod: PN

## 2023-03-28 PROCEDURE — 97161 PT EVAL LOW COMPLEX 20 MIN: CPT | Mod: PN

## 2023-03-28 NOTE — PATIENT INSTRUCTIONS
TOES: Towel Bunching        With involved straight toes on towel, bend toes bunching up towel _20_ times.  Do _1-2__ times per day.    Copyright © [employer]. All rights reserved.     Toe Spread        Sitting, spread toes, then bring them together.  Repeat _20__ times. Do _1-2__ sessions per day.    Copyright © [employer]. All rights reserved.       Toe Yoga        Sit or stand with foot in contact with the floor. Start by lifting big toe while other toes stay in contact with the ground. Then reverse the movement by pressing big toe into ground and lift other four toes. Keep sole of foot in contact with the ground at all times. Repeat on each foot.     Perform 20 reps    Copyright © 2023 [employer]    SEATED CALF STRETCH - GASTROC      While sitting, use a towel or other strap looped around your foot. Gently pull your ankle back until a stretch is felt along the back of your lower leg. Maintain your target knee straight the entire time.     Perform 3 reps, hold for 30 seconds, twice a day.    Copyright © 2023 [employer]      SEATED CALF STRETCH - SOLEUS          While sitting, use a towel or other strap looped around your foot. Gently pull your ankle back until a stretch is felt along the back of your lower leg.     Perform 3 reps, hold for 30 seconds, twice a day.    Your knee should be slightly bent the entire time.    Copyright © 2023 [employer]    FROZEN BOTTLE ROLL - PLANTAR FASCIA         Place foot on plastic frozen water bottle. Roll frozen water bottle from the ball of your foot to the heel along the arch.    Copyright © 2023 [employer]

## 2023-03-29 NOTE — PLAN OF CARE
[facility]  Physical Therapy Initial Evaluation    Name: Evan Sousa  Clinic Number: [id number]    Therapy Diagnosis:   Encounter Diagnoses   Name Primary?    Plantar fasciitis     Foot pain, bilateral     Equinus contracture of ankle     Decreased range of motion of both ankles      Physician: Geraldo Tian DPM    Physician Orders: PT Eval and Treat   Medical Diagnosis:   M72.2 (ICD-10-CM) - Plantar fasciitis  M79.671,M79.672 (ICD-10-CM) - Foot pain, bilateral  M24.573 (ICD-10-CM) - Equinus contracture of ankle  Evaluation Date: 3/28/2023  Authorization Period Expiration: 3/22/2024  Plan of Care Certification Period: 5/23/2023  Visit # / Visits authorized: 1/ 1    Time In: 7:02 am  Time Out: 7:40 am  Total Billable Time separate from evaluation: 12 minutes    Precautions: Standard    Subjective   Date of onset: Summer 2022  History of current condition - Evan reports: she began to have B foot pain since last summer. Doing stretches 2-3 times a day, wearing inserts, taking Ibuprofen. Has stopped running this month as part of her exercise regime       Past Medical History:   Diagnosis Date    COVID-19     2020     Evan Sousa  has no past surgical history on file.    Evan has a current medication list which includes the following prescription(s): azithromycin, benzonatate, brompheniramine-pseudoeph-dm, doxycycline, levonorgestrel-ethinyl estradiol, and meloxicam.    Review of patient's allergies indicates:  No Known Allergies     Imaging,   9/9/2022  FINDINGS:  Right foot:     No fracture.  No malalignment.  Preserved bone density.  Preserved joint spaces.  No opaque soft tissue foreign body.  Soft tissue swelling dorsally at the level of the metatarsals and MTP articulations.     Left foot:     No fracture.  No malalignment.  Preserved bone density.  Preserved joint spaces.  No opaque soft tissue foreign body.  Soft tissue swelling dorsally at the level of the  "metatarsals and MTP articulations.      Prior Therapy: PT for L knee, low back pain  Social History:  lives with an adult . 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  Easing Factors: stretches, Ibuprofen, wearing inserts    Pts goals: "I just want t not have pain in my feet, not be limited."    Objective       Functional assessment:   - walking:   - sit to stand:     AROM  LE MMT  R  L    Hip flexion  5/5  5/5    Hip abduction  4/5  /5    Hip extension  5/5  5/5    Hip ER  5/5  /5    Hip IR  5/5  /5    Knee extension  5/5  5/5    Knee flexion  5/5  5/5    Ankle dorsiflexion  5/5  5/5    Ankle plantar flexion  5/5  5/5    Ankle inversion  5/5  5/5    Ankle eversion  5/5  5/5        Flexibility testing:  - hamstrings:           B: WNL  - gastrocnemius:     B: tight  - piriformis:              B: WNL  - quadriceps:           B: WNL  - hip adductors:     B: WNL  - hip flexors:           B: tight, R: decreased 25%,L: decreased 50%  - IT bands:            B: tight, decreased 50%    Joint mobility:                              R                     L  Ankle dorsiflexion                -10 deg              -4 deg  Ankle plantar flexion            60 deg               50 deg  Ankle inversion                    40 deg               30 deg  Ankle eversion                     18 deg               20 deg        CMS Impairment/Limitation/Restriction for FOTO Foot Survey    Therapist reviewed FOTO scores for Evan Sousa on 3/28/2023.   FOTO documents entered into [employer] - see Media section.    Limitation Score: 48%  Category: Mobility    Current : CK = at least 40% but " "< 60% impaired, limited or restricted  Goal: CJ = at least 20% but < 40% impaired, limited or restricted       TREATMENT   Treatment Time In: 7:24 am  Treatment Time Out: 7:36 am  Total Treatment time separate from Evaluation time: 12 deg    Evan received therapeutic exercises to develop strength, endurance, ROM, and flexibility for 12  minutes including:  Toe spreads x 30 reps  Towel scrunches x 30 reps  Toe yoga x 20 reps  Soleus stretch with towel 3 x 30"  Gastroc stretch with towel 3 x 30"      Home Exercises Provided and Patient Education Provided     Education provided:   - Discussed the role of the PTA on the Rehab Team. Discussed patient will be seen by a physical therapist minimally every 6th visit or every 30 days prior to being seen by PTA. Also discussed the use of the [facility] for communication.    Toe spreads  Towel scrunches  Toe yoga  Soleus stretch with towel  Gastroc stretch with towel  Frozen water bottle roll    Written Home Exercises Provided: yes.  Exercises were reviewed and Evan was able to demonstrate them prior to the end of the session.  Evan demonstrated good  understanding of the education provided.     See EMR under Patient Instructions for exercises provided 3/28/2023.  Assessment   Evan is a 28 y.o. female referred to outpatient Physical Therapy with a medical diagnosis of M72.2 (ICD-10-CM) - Plantar fasciitis, M79.671,M79.672 (ICD-10-CM) - Foot pain, bilateral, M24.573 (ICD-10-CM) - Equinus contracture of ankle . Pt presents with decreased dorsiflexion in B ankles affecting pain in B feet. Will benefit from OP PT for manual therapy, dry needling, intrinsic foot muscle strengthening, and calf strengthening to progress to below listed goals and return pt to PLOF and running.    Pt prognosis is Excellent.   Pt will benefit from skilled outpatient Physical Therapy to address the deficits stated above and in the chart below, provide pt/family education, and to maximize " pt's level of independence.     Plan of care discussed with patient: Yes  Pt's spiritual, cultural and educational needs considered and patient is agreeable to the plan of care and goals as stated below:     Anticipated Barriers for therapy: none    Medical Necessity is demonstrated by the following  History  Co-morbidities and personal factors that may impact the plan of care Co-morbidities:   none    Personal Factors:   no deficits     low   Examination  Body Structures and Functions, activity limitations and participation restrictions that may impact the plan of care Body Regions:   lower extremities    Body Systems:    ROM  strength  gross coordinated movement  balance  gait    Participation Restrictions:   none    Activity limitations:   Learning and applying knowledge  no deficits    General Tasks and Commands  no deficits    Communication  no deficits    Mobility  walking  Running, transferring up/down to floor    Self care  no deficits    Domestic Life  no deficits    Interactions/Relationships  no deficits    Life Areas  no deficits    Community and Social Life  no deficits         moderate   Clinical Presentation stable and uncomplicated low   Decision Making/ Complexity Score: low     Goals:  Short Term Goals: 4 weeks   Independent with HEP.  Report decreased B foot pain < or =  5/10 with adls such as walking, standing, sit to stand from the floor, and running.  Increased MMT for B LE by 1 muscle grade to promote proper pelvic stability to decrease B foot pain < or =  5/10 with adls such as walking, standing, sit to stand from the floor, and running.   Increased B ankle dorsiflexion to 5 deg    Long Term Goals: 8 weeks   Increased B ankle dorsiflexion to 10 deg  Report decreased B foot pain < or =  2/10 with adls such as walking, standing, sit to stand from the floor, and running.  Increased MMT for B LE by 1 muscle grade to promote proper pelvic stability to decrease B foot pain < or =  2/10 with adls  such as walking, standing, sit to stand from the floor, and running.   Increased flexibility in B hip flexors and B IT bands to promote proper pelvic stability to decrease B foot pain < or =  2/10 with adls such as walking, standing, sit to stand from the floor, and running.  Patient to achieve CJ (at least 20% < 40% impaired, limited or restricted) level on the FOTO Outcomes Measurement System.    Plan   Certification Period/Plan of care expiration: 3/28/2023 to 5/23/2023.    Outpatient Physical Therapy 1 times weekly for 8 weeks to include the following interventions: Manual Therapy, Moist Heat/ Ice, Neuromuscular Re-ed, Patient Education, Therapeutic Activities, Therapeutic Exercise, and Dry Needling.     Julian Galvez, PT

## 2023-04-04 NOTE — PROGRESS NOTES
"[facility]   Physical Therapy Treatment Note     Name: Evan Sousa  Clinic Number: [id number]    Therapy Diagnosis:   Encounter Diagnosis   Name Primary?    Decreased range of motion of both ankles Yes     Physician: Geraldo Tian DPM    Visit Date: 4/5/2023    Physician Orders: PT Eval and Treat   Medical Diagnosis:   M72.2 (ICD-10-CM) - Plantar fasciitis  M79.671,M79.672 (ICD-10-CM) - Foot pain, bilateral  M24.573 (ICD-10-CM) - Equinus contracture of ankle  Evaluation Date: 3/28/2023  Authorization Period Expiration: 3/22/2024  Plan of Care Certification Period: 5/23/2023  Visit # / Visits authorized: 2 (1/ 20)  FOTO: 1/ 5 (3/28/2023)  PTA Visit #: 1/ 5    Precautions: Standard    Time In: 4:17 pm  Time Out: 5:02 pm  Total Billable Time: 45 minutes    SUBJECTIVE   Pt reports: The L heel is hurting today. The R usually hurts but it feels fine today.    She was compliant with home exercise program.  Response to previous treatment: "It was good"  Functional change: None today    Pain: 3/10  Location: L heel    OBJECTIVE     3/28/2023:  AROM  LE MMT  R  L    Hip flexion  5/5  5/5    Hip abduction  4/5  /5    Hip extension  5/5  5/5    Hip ER  5/5  /5    Hip IR  5/5  /5    Knee extension  5/5  5/5    Knee flexion  5/5  5/5    Ankle dorsiflexion  5/5  5/5    Ankle plantar flexion  5/5  5/5    Ankle inversion  5/5  5/5    Ankle eversion  5/5  5/5          Flexibility testing:  - hamstrings:           B: WNL  - gastrocnemius:     B: tight  - piriformis:              B: WNL  - quadriceps:           B: WNL  - hip adductors:     B: WNL  - hip flexors:           B: tight, R: decreased 25%,L: decreased 50%  - IT bands:            B: tight, decreased 50%     Joint mobility:                              R                     L  Ankle dorsiflexion                -10 deg              -4 deg  Ankle plantar flexion            60 deg               50 deg  Ankle inversion                    40 deg " "              30 deg  Ankle eversion                     18 deg               20 deg     CMS Impairment/Limitation/Restriction for FOTO Foot Survey     Therapist reviewed FOTO scores for Evan Sousa on 3/28/2023.   FOTO documents entered into [employer] - see Media section.     Limitation Score: 48%  Category: Mobility     Current : CK = at least 40% but < 60% impaired, limited or restricted  Goal: CJ = at least 20% but < 40% impaired, limited or restricted     TREATMENT     Evan received the bolded treatments listed below:      Patient received therapeutic exercises for 25 minutes for improved strength and AROM including:  Toe spreads x 30 reps  Towel scrunches x 30 reps  Toe yoga x 20 reps  Soleus stretch with towel 3 x 30"  Gastroc stretch with towel 3 x 30"  +DL heel raise to SL eccentric lowering x 15      Patient received manual therapeutic technique for 20 minutes for improved soft tissue and joint mobility including:  IASTM scrapping B achilles and plantar fascia  AP Talocrural mob I-III  Talocrural distraction      Patient received neuromuscular reeducation for 00 minutes for improved proprioception and balance including:        Patient received therapeutic activities for 00 minutes for improved tolerance to functional activities including:        PATIENT EDUCATION AND HOME EXERCISES     Home Exercises Provided and Patient Education Provided     Education provided:   - Educated pt on importance of compliance with their HEP this visit.     Written Home Exercises Provided: Patient instructed to cont prior HEP. Exercises were reviewed and Evan was able to demonstrate them prior to the end of the session.  Evan demonstrated good  understanding of the education provided. See EMR under Patient Instructions for exercises provided during therapy sessions    ASSESSMENT   Pt presents to clinic for initial treatment following evaluation. Pt tolerated treatment well with mild discomfort during scrapping on the " medial side and slightly anterior area of the heel. No other c/o pain noted, only gastroc fatigue with heel raises.     Evan Is progressing well towards her goals.   Pt prognosis is Good.     Pt will continue to benefit from skilled outpatient physical therapy to address the deficits listed in the problem list box on initial evaluation, provide pt/family education and to maximize pt's level of independence in the home and community environment.     Pt's spiritual, cultural and educational needs considered and pt agreeable to plan of care and goals.     Anticipated barriers to physical therapy: None    Goals:   Short Term Goals: 4 weeks   Independent with HEP.  Report decreased B foot pain < or =  5/10 with adls such as walking, standing, sit to stand from the floor, and running.  Increased MMT for B LE by 1 muscle grade to promote proper pelvic stability to decrease B foot pain < or =  5/10 with adls such as walking, standing, sit to stand from the floor, and running.   Increased B ankle dorsiflexion to 5 deg     Long Term Goals: 8 weeks   Increased B ankle dorsiflexion to 10 deg  Report decreased B foot pain < or =  2/10 with adls such as walking, standing, sit to stand from the floor, and running.  Increased MMT for B LE by 1 muscle grade to promote proper pelvic stability to decrease B foot pain < or =  2/10 with adls such as walking, standing, sit to stand from the floor, and running.   Increased flexibility in B hip flexors and B IT bands to promote proper pelvic stability to decrease B foot pain < or =  2/10 with adls such as walking, standing, sit to stand from the floor, and running.  Patient to achieve CJ (at least 20% < 40% impaired, limited or restricted) level on the FOTO Outcomes Measurement System.    PLAN   Certification Period/Plan of care expiration: 3/28/2023 to 5/23/2023.    Improve B LE strength and increase dorsiflexion      Outpatient Physical Therapy 1 times weekly for 8 weeks to include the  following interventions: Manual Therapy, Moist Heat/ Ice, Neuromuscular Re-ed, Patient Education, Therapeutic Activities, Therapeutic Exercise, and Dry Needling.     Jose Davis III, PTA

## 2023-04-05 ENCOUNTER — DOCUMENTATION ONLY (OUTPATIENT)
Dept: REHABILITATION | Facility: OTHER | Age: 28
End: 2023-04-05

## 2023-04-05 ENCOUNTER — CLINICAL SUPPORT (OUTPATIENT)
Dept: REHABILITATION | Facility: OTHER | Age: 28
End: 2023-04-05
Payer: COMMERCIAL

## 2023-04-05 DIAGNOSIS — M25.672 DECREASED RANGE OF MOTION OF BOTH ANKLES: Primary | ICD-10-CM

## 2023-04-05 DIAGNOSIS — M25.671 DECREASED RANGE OF MOTION OF BOTH ANKLES: Primary | ICD-10-CM

## 2023-04-05 PROCEDURE — 97140 MANUAL THERAPY 1/> REGIONS: CPT | Mod: PN,CQ

## 2023-04-05 PROCEDURE — 97110 THERAPEUTIC EXERCISES: CPT | Mod: PN,CQ

## 2023-04-05 NOTE — PROGRESS NOTES
PT/PTA met face to face to discuss pt's treatment plan and progress towards established goals. Pt will be seen by a physical therapist minimally every 6th visit or every 30 days.    Jose Davis III, PTA    Meeting as noted above.    Julian Galvez, PT, CWS, Cert DN

## 2023-04-11 NOTE — PROGRESS NOTES
"[facility]   Physical Therapy Treatment Note     Name: Evan Sousa  Clinic Number: [id number]    Therapy Diagnosis:   Encounter Diagnosis   Name Primary?    Decreased range of motion of both ankles Yes     Physician: Geraldo Tian DPM    Visit Date: 4/12/2023    Physician Orders: PT Eval and Treat   Medical Diagnosis:   M72.2 (ICD-10-CM) - Plantar fasciitis  M79.671,M79.672 (ICD-10-CM) - Foot pain, bilateral  M24.573 (ICD-10-CM) - Equinus contracture of ankle  Evaluation Date: 3/28/2023  Authorization Period Expiration: 3/22/2024  Plan of Care Certification Period: 5/23/2023  Visit # / Visits authorized: 3 (2/ 20)  FOTO: 2/ 5 (3/28/2023)  PTA Visit #: 2/ 5    Precautions: Standard    Time In: 1:52 pm  Time Out: 2:45 pm  Total Billable Time: 53 minutes    SUBJECTIVE   Pt reports: Felt a sharp pain while working out this morning on the lateral side of L foot. Heel feel much better    She was compliant with home exercise program.  Response to previous treatment: "It felt great"  Functional change: None today    Pain: 1/10  Location: L heel    OBJECTIVE     3/28/2023:  AROM  LE MMT  R  L    Hip flexion  5/5  5/5    Hip abduction  4/5  /5    Hip extension  5/5  5/5    Hip ER  5/5  /5    Hip IR  5/5  /5    Knee extension  5/5  5/5    Knee flexion  5/5  5/5    Ankle dorsiflexion  5/5  5/5    Ankle plantar flexion  5/5  5/5    Ankle inversion  5/5  5/5    Ankle eversion  5/5  5/5          Flexibility testing:  - hamstrings:           B: WNL  - gastrocnemius:     B: tight  - piriformis:              B: WNL  - quadriceps:           B: WNL  - hip adductors:     B: WNL  - hip flexors:           B: tight, R: decreased 25%,L: decreased 50%  - IT bands:            B: tight, decreased 50%     Joint mobility:                              R                     L  Ankle dorsiflexion                -10 deg              -4 deg  Ankle plantar flexion            60 deg               50 deg  Ankle " "inversion                    40 deg               30 deg  Ankle eversion                     18 deg               20 deg     CMS Impairment/Limitation/Restriction for FOTO Foot Survey     Therapist reviewed FOTO scores for Evan Sousa on 3/28/2023.   FOTO documents entered into [employer] - see Media section.     Limitation Score: 48%  Category: Mobility     Current : CK = at least 40% but < 60% impaired, limited or restricted  Goal: CJ = at least 20% but < 40% impaired, limited or restricted     TREATMENT     Evan received the bolded treatments listed below:      Patient received therapeutic exercises for 25 minutes for improved strength and AROM including:  Toe spreads x 30 reps  Towel scrunches x 30 reps  Toe yoga x 20 reps  Soleus stretch with towel 3 x 30"  Gastroc stretch with towel 3 x 30"  DL heel raise to SL eccentric lowering x 15      Patient received manual therapeutic technique for 20 minutes for improved soft tissue and joint mobility including:  IASTM scrapping B achilles and plantar fascia  AP Talocrural mob I-III  Talocrural distraction      Patient received neuromuscular reeducation for 8 minutes for improved proprioception and balance including:  +NBOS on foam 3 x 30"  +Tandem 3 x 30"      Patient received therapeutic activities for 00 minutes for improved tolerance to functional activities including:        PATIENT EDUCATION AND HOME EXERCISES     Home Exercises Provided and Patient Education Provided     Education provided:   - Continuance of HEP    Written Home Exercises Provided: Patient instructed to cont prior HEP. Exercises were reviewed and Evan was able to demonstrate them prior to the end of the session.  Evan demonstrated good  understanding of the education provided. See EMR under Patient Instructions for exercises provided during therapy sessions    ASSESSMENT   Pt tolerated the addition of static balance well today. Pt noted some tenderness in R calf with scraping. Will " attempt dynamic balance exercises in future visit.    Evan Is progressing well towards her goals.   Pt prognosis is Good.     Pt will continue to benefit from skilled outpatient physical therapy to address the deficits listed in the problem list box on initial evaluation, provide pt/family education and to maximize pt's level of independence in the home and community environment.     Pt's spiritual, cultural and educational needs considered and pt agreeable to plan of care and goals.     Anticipated barriers to physical therapy: None    Goals:   Short Term Goals: 4 weeks   Independent with HEP.  Report decreased B foot pain < or =  5/10 with adls such as walking, standing, sit to stand from the floor, and running.  Increased MMT for B LE by 1 muscle grade to promote proper pelvic stability to decrease B foot pain < or =  5/10 with adls such as walking, standing, sit to stand from the floor, and running.   Increased B ankle dorsiflexion to 5 deg     Long Term Goals: 8 weeks   Increased B ankle dorsiflexion to 10 deg  Report decreased B foot pain < or =  2/10 with adls such as walking, standing, sit to stand from the floor, and running.  Increased MMT for B LE by 1 muscle grade to promote proper pelvic stability to decrease B foot pain < or =  2/10 with adls such as walking, standing, sit to stand from the floor, and running.   Increased flexibility in B hip flexors and B IT bands to promote proper pelvic stability to decrease B foot pain < or =  2/10 with adls such as walking, standing, sit to stand from the floor, and running.  Patient to achieve CJ (at least 20% < 40% impaired, limited or restricted) level on the FOTO Outcomes Measurement System.    PLAN   Certification Period/Plan of care expiration: 3/28/2023 to 5/23/2023.    Improve B LE strength and increase dorsiflexion      Outpatient Physical Therapy 1 times weekly for 8 weeks to include the following interventions: Manual Therapy, Moist Heat/ Ice,  Neuromuscular Re-ed, Patient Education, Therapeutic Activities, Therapeutic Exercise, and Dry Needling.     Jose Davis III, PTA

## 2023-04-12 ENCOUNTER — CLINICAL SUPPORT (OUTPATIENT)
Dept: REHABILITATION | Facility: OTHER | Age: 28
End: 2023-04-12
Payer: COMMERCIAL

## 2023-04-12 DIAGNOSIS — M25.671 DECREASED RANGE OF MOTION OF BOTH ANKLES: Primary | ICD-10-CM

## 2023-04-12 DIAGNOSIS — M25.672 DECREASED RANGE OF MOTION OF BOTH ANKLES: Primary | ICD-10-CM

## 2023-04-12 PROCEDURE — 97110 THERAPEUTIC EXERCISES: CPT | Mod: PN,CQ

## 2023-04-12 PROCEDURE — 97112 NEUROMUSCULAR REEDUCATION: CPT | Mod: PN,CQ

## 2023-04-12 PROCEDURE — 97140 MANUAL THERAPY 1/> REGIONS: CPT | Mod: PN,CQ

## 2023-05-01 ENCOUNTER — CLINICAL SUPPORT (OUTPATIENT)
Dept: REHABILITATION | Facility: OTHER | Age: 28
End: 2023-05-01
Payer: COMMERCIAL

## 2023-05-01 DIAGNOSIS — M25.671 DECREASED RANGE OF MOTION OF BOTH ANKLES: Primary | ICD-10-CM

## 2023-05-01 DIAGNOSIS — M25.672 DECREASED RANGE OF MOTION OF BOTH ANKLES: Primary | ICD-10-CM

## 2023-05-01 PROCEDURE — 97112 NEUROMUSCULAR REEDUCATION: CPT | Mod: PN

## 2023-05-01 PROCEDURE — 97110 THERAPEUTIC EXERCISES: CPT | Mod: PN

## 2023-05-01 NOTE — PROGRESS NOTES
"[facility]   Physical Therapy Treatment Note     Name: Evan Sousa  Clinic Number: [id number]    Therapy Diagnosis:   Encounter Diagnosis   Name Primary?    Decreased range of motion of both ankles Yes     Physician: Geraldo Tian DPM    Visit Date: 5/1/2023    Physician Orders: PT Eval and Treat   Medical Diagnosis:   M72.2 (ICD-10-CM) - Plantar fasciitis  M79.671,M79.672 (ICD-10-CM) - Foot pain, bilateral  M24.573 (ICD-10-CM) - Equinus contracture of ankle  Evaluation Date: 3/28/2023  Authorization Period Expiration: 3/22/2024  Plan of Care Certification Period: 5/23/2023  Visit # / Visits authorized: 4 (3/ 20)  FOTO: 1/ 5 (5/1/2023)  PTA Visit #: 2/ 5    Precautions: Standard    Time In: 4:09 pm  Time Out: 4:59 pm  Total Billable Time: 50 minutes    SUBJECTIVE   Pt reports: she has been doing ok. States she went to the [employer] on Friday and had increased pain.     She was compliant with home exercise program.  Response to previous treatment:"It was good."  Functional change: getting out of bed, first thing in the morning is better. Getting up from the floor after sitting on the floor is better.    Pain: 1/10  Location: B heel    OBJECTIVE       AROM  LE MMT  R  L    Hip flexion  5/5  5/5    Hip abduction  4/5  5/5    Hip extension  5/5  5/5    Hip ER  5/5  5/5    Hip IR  5/5  5/5    Knee extension  5/5  5/5    Knee flexion  5/5  5/5    Ankle dorsiflexion  5/5  5/5    Ankle plantar flexion  5/5  5/5    Ankle inversion  5/5  5/5    Ankle eversion  5/5  5/5          Flexibility testing:  - hamstrings:           B: WNL  - gastrocnemius:     B: tight, R: 2 deg, L: 4 deg  - piriformis:              B: WNL  - quadriceps:           B: WNL  - hip adductors:      B: WNL  - hip flexors:           B: tight, R: decreased 25%,L: decreased 50%  - IT bands:             B: tight, decreased 50%     Joint mobility:                              R                     L  Ankle dorsiflexion       " "            2 deg               4 deg  Ankle plantar flexion            60 deg               50 deg  Ankle inversion                    45 deg               55 deg  Ankle eversion                     30 deg               30 deg     CMS Impairment/Limitation/Restriction for FOTO Foot Survey     Therapist reviewed FOTO scores for Evan Sousa on 5/1/2023.   FOTO documents entered into [employer] - see Media section.     Limitation Score: 28%  Category: Mobility     Current : CJ = at least 20% but < 40% impaired, limited or restricted  Goal: CJ = at least 20% but < 40% impaired, limited or restricted     TREATMENT     Evan received the bolded treatments listed below:      Patient received therapeutic exercises for 35 minutes for improved strength and AROM including:  Toe spreads x 30 reps  Towel scrunches x 30 reps  Toe yoga x 20 reps  Soleus stretch with towel 3 x 30"  Gastroc stretch with towel 3 x 30"    DL heel raise to SL eccentric lowering x 15 on black foam - nv    Heel raises on small black 1/2 foam roll 3 x 12 reps  Hallux abduction off small black 1/2 foam roll x 20 reps  Hallux flexion with red theraband x 20 reps      Patient received manual therapeutic technique for minutes for improved soft tissue and joint mobility including:  IASTM scrapping B achilles and plantar fascia  AP Talocrural mob I-III  Talocrural distraction      Patient received neuromuscular reeducation for 15 minutes for improved proprioception and balance including:  NBOS on foam 3 x 30"  Tandem 3 x 30"  Hesitation marching 2 x 40 ft  Lateral stepping 2 x 40 ft      Patient received therapeutic activities for 00 minutes for improved tolerance to functional activities including:        PATIENT EDUCATION AND HOME EXERCISES     Home Exercises Provided and Patient Education Provided     Education provided:   - Continuance of HEP    Written Home Exercises Provided: Patient instructed to cont prior HEP. Exercises were reviewed and Evan " was able to demonstrate them prior to the end of the session.  Evan demonstrated good  understanding of the education provided. See EMR under Patient Instructions for exercises provided during therapy sessions    ASSESSMENT   Increased B ankle ROM noted today. Pt progressing to goals. Improved performance of toe yoga. Added hallux abduction and resisted toe flexion today. Good training effect reported with new exercise.    Evan Is progressing well towards her goals.   Pt prognosis is Good.     Pt will continue to benefit from skilled outpatient physical therapy to address the deficits listed in the problem list box on initial evaluation, provide pt/family education and to maximize pt's level of independence in the home and community environment.     Pt's spiritual, cultural and educational needs considered and pt agreeable to plan of care and goals.     Anticipated barriers to physical therapy: None    Goals:   Short Term Goals: 4 weeks   Independent with HEP. (Ongoing)  Report decreased B foot pain < or =  5/10 with adls such as walking, standing, sit to stand from the floor, and running. (progressing, not met)  Increased MMT for B LE by 1 muscle grade to promote proper pelvic stability to decrease B foot pain < or =  5/10 with adls such as walking, standing, sit to stand from the floor, and running.  (progressing, not met)  Increased B ankle dorsiflexion to 5 deg. (progressing, not met)     Long Term Goals: 8 weeks   Increased B ankle dorsiflexion to 10 deg (progressing, not met)  Report decreased B foot pain < or =  2/10 with adls such as walking, standing, sit to stand from the floor, and running. (progressing, not met)  Increased MMT for B LE by 1 muscle grade to promote proper pelvic stability to decrease B foot pain < or =  2/10 with adls such as walking, standing, sit to stand from the floor, and running.  (progressing, not met)  Increased flexibility in B hip flexors and B IT bands to promote proper  pelvic stability to decrease B foot pain < or =  2/10 with adls such as walking, standing, sit to stand from the floor, and running. (progressing, not met)  Patient to achieve CJ (at least 20% < 40% impaired, limited or restricted) level on the FOTO Outcomes Measurement System. (met)    PLAN   Certification Period/Plan of care expiration: 3/28/2023 to 5/23/2023.    Improve B LE strength and increase dorsiflexion      Outpatient Physical Therapy 1 times weekly for 8 weeks to include the following interventions: Manual Therapy, Moist Heat/ Ice, Neuromuscular Re-ed, Patient Education, Therapeutic Activities, Therapeutic Exercise, and Dry Needling.     Julian Galvez, PT

## 2023-05-01 NOTE — PROGRESS NOTES
"[facility]   Physical Therapy Treatment Note     Name: Evan Sousa  Clinic Number: [id number]    Therapy Diagnosis:   No diagnosis found.    Physician: Geraldo Tian DPM    Visit Date: 5/1/2023    Physician Orders: PT Eval and Treat   Medical Diagnosis:   M72.2 (ICD-10-CM) - Plantar fasciitis  M79.671,M79.672 (ICD-10-CM) - Foot pain, bilateral  M24.573 (ICD-10-CM) - Equinus contracture of ankle  Evaluation Date: 3/28/2023  Authorization Period Expiration: 3/22/2024  Plan of Care Certification Period: 5/23/2023  Visit # / Visits authorized: 3 (2/ 20)  FOTO: 2/ 5 (3/28/2023)  PTA Visit #: 2/ 5    Precautions: Standard    Time In: 1:52 pm  Time Out: 2:45 pm  Total Billable Time: 53 minutes    SUBJECTIVE   Pt reports: Felt a sharp pain while working out this morning on the lateral side of L foot. Heel feel much better    She was compliant with home exercise program.  Response to previous treatment: "It felt great"  Functional change: None today    Pain: 1/10  Location: L heel    OBJECTIVE     3/28/2023:  AROM  LE MMT  R  L    Hip flexion  5/5  5/5    Hip abduction  4/5  /5    Hip extension  5/5  5/5    Hip ER  5/5  /5    Hip IR  5/5  /5    Knee extension  5/5  5/5    Knee flexion  5/5  5/5    Ankle dorsiflexion  5/5  5/5    Ankle plantar flexion  5/5  5/5    Ankle inversion  5/5  5/5    Ankle eversion  5/5  5/5          Flexibility testing:  - hamstrings:           B: WNL  - gastrocnemius:     B: tight  - piriformis:              B: WNL  - quadriceps:           B: WNL  - hip adductors:     B: WNL  - hip flexors:           B: tight, R: decreased 25%,L: decreased 50%  - IT bands:            B: tight, decreased 50%     Joint mobility:                              R                     L  Ankle dorsiflexion                -10 deg              -4 deg  Ankle plantar flexion            60 deg               50 deg  Ankle inversion                    40 deg               30 deg  Ankle " "eversion                     18 deg               20 deg     CMS Impairment/Limitation/Restriction for FOTO Foot Survey     Therapist reviewed FOTO scores for Evan Sousa on 3/28/2023.   FOTO documents entered into [employer] - see Media section.     Limitation Score: 48%  Category: Mobility     Current : CK = at least 40% but < 60% impaired, limited or restricted  Goal: CJ = at least 20% but < 40% impaired, limited or restricted     TREATMENT     Evan received the bolded treatments listed below:      Patient received therapeutic exercises for 25 minutes for improved strength and AROM including:  Toe spreads x 30 reps  Towel scrunches x 30 reps  Toe yoga x 20 reps  Soleus stretch with towel 3 x 30"  Gastroc stretch with towel 3 x 30"  DL heel raise to SL eccentric lowering x 15      Patient received manual therapeutic technique for 20 minutes for improved soft tissue and joint mobility including:  IASTM scrapping B achilles and plantar fascia  AP Talocrural mob I-III  Talocrural distraction      Patient received neuromuscular reeducation for 8 minutes for improved proprioception and balance including:  +NBOS on foam 3 x 30"  +Tandem 3 x 30"      Patient received therapeutic activities for 00 minutes for improved tolerance to functional activities including:        PATIENT EDUCATION AND HOME EXERCISES     Home Exercises Provided and Patient Education Provided     Education provided:   - Continuance of HEP    Written Home Exercises Provided: Patient instructed to cont prior HEP. Exercises were reviewed and Evan was able to demonstrate them prior to the end of the session.  Evan demonstrated good  understanding of the education provided. See EMR under Patient Instructions for exercises provided during therapy sessions    ASSESSMENT   Pt tolerated the addition of static balance well today. Pt noted some tenderness in R calf with scraping. Will attempt dynamic balance exercises in future visit.    Evan Is " progressing well towards her goals.   Pt prognosis is Good.     Pt will continue to benefit from skilled outpatient physical therapy to address the deficits listed in the problem list box on initial evaluation, provide pt/family education and to maximize pt's level of independence in the home and community environment.     Pt's spiritual, cultural and educational needs considered and pt agreeable to plan of care and goals.     Anticipated barriers to physical therapy: None    Goals:   Short Term Goals: 4 weeks   Independent with HEP.  Report decreased B foot pain < or =  5/10 with adls such as walking, standing, sit to stand from the floor, and running.  Increased MMT for B LE by 1 muscle grade to promote proper pelvic stability to decrease B foot pain < or =  5/10 with adls such as walking, standing, sit to stand from the floor, and running.   Increased B ankle dorsiflexion to 5 deg     Long Term Goals: 8 weeks   Increased B ankle dorsiflexion to 10 deg  Report decreased B foot pain < or =  2/10 with adls such as walking, standing, sit to stand from the floor, and running.  Increased MMT for B LE by 1 muscle grade to promote proper pelvic stability to decrease B foot pain < or =  2/10 with adls such as walking, standing, sit to stand from the floor, and running.   Increased flexibility in B hip flexors and B IT bands to promote proper pelvic stability to decrease B foot pain < or =  2/10 with adls such as walking, standing, sit to stand from the floor, and running.  Patient to achieve CJ (at least 20% < 40% impaired, limited or restricted) level on the FOTO Outcomes Measurement System.    PLAN   Certification Period/Plan of care expiration: 3/28/2023 to 5/23/2023.    Improve B LE strength and increase dorsiflexion      Outpatient Physical Therapy 1 times weekly for 8 weeks to include the following interventions: Manual Therapy, Moist Heat/ Ice, Neuromuscular Re-ed, Patient Education, Therapeutic Activities,  Therapeutic Exercise, and Dry Needling.     Hector Rodriguez, PTA

## 2023-05-02 ENCOUNTER — LAB VISIT (OUTPATIENT)
Dept: LAB | Facility: HOSPITAL | Age: 28
End: 2023-05-02
Payer: COMMERCIAL

## 2023-05-02 DIAGNOSIS — E78.2 MIXED HYPERLIPIDEMIA: ICD-10-CM

## 2023-05-02 LAB
CHOLEST SERPL-MCNC: 206 MG/DL (ref 120–199)
CHOLEST/HDLC SERPL: 3.4 {RATIO} (ref 2–5)
HDLC SERPL-MCNC: 60 MG/DL (ref 40–75)
HDLC SERPL: 29.1 % (ref 20–50)
LDLC SERPL CALC-MCNC: 121.4 MG/DL (ref 63–159)
NONHDLC SERPL-MCNC: 146 MG/DL
TRIGL SERPL-MCNC: 123 MG/DL (ref 30–150)

## 2023-05-02 PROCEDURE — 80061 LIPID PANEL: CPT | Performed by: INTERNAL MEDICINE

## 2023-05-02 PROCEDURE — 36415 COLL VENOUS BLD VENIPUNCTURE: CPT | Performed by: INTERNAL MEDICINE

## 2023-05-08 ENCOUNTER — CLINICAL SUPPORT (OUTPATIENT)
Dept: REHABILITATION | Facility: OTHER | Age: 28
End: 2023-05-08
Payer: COMMERCIAL

## 2023-05-08 ENCOUNTER — OFFICE VISIT (OUTPATIENT)
Dept: INTERNAL MEDICINE | Facility: CLINIC | Age: 28
End: 2023-05-08
Payer: COMMERCIAL

## 2023-05-08 VITALS
SYSTOLIC BLOOD PRESSURE: 132 MMHG | WEIGHT: 188.94 LBS | OXYGEN SATURATION: 99 % | DIASTOLIC BLOOD PRESSURE: 78 MMHG | HEIGHT: 66 IN | HEART RATE: 100 BPM | BODY MASS INDEX: 30.36 KG/M2

## 2023-05-08 DIAGNOSIS — M25.672 DECREASED RANGE OF MOTION OF BOTH ANKLES: Primary | ICD-10-CM

## 2023-05-08 DIAGNOSIS — E78.2 MIXED HYPERLIPIDEMIA: ICD-10-CM

## 2023-05-08 DIAGNOSIS — M25.671 DECREASED RANGE OF MOTION OF BOTH ANKLES: Primary | ICD-10-CM

## 2023-05-08 DIAGNOSIS — D23.9 DERMATOFIBROMA: ICD-10-CM

## 2023-05-08 DIAGNOSIS — Z00.00 ENCOUNTER FOR ANNUAL PHYSICAL EXAM: Primary | ICD-10-CM

## 2023-05-08 PROCEDURE — 3078F PR MOST RECENT DIASTOLIC BLOOD PRESSURE < 80 MM HG: ICD-10-PCS | Mod: CPTII,S$GLB,, | Performed by: INTERNAL MEDICINE

## 2023-05-08 PROCEDURE — 97112 NEUROMUSCULAR REEDUCATION: CPT | Mod: PN

## 2023-05-08 PROCEDURE — 3075F PR MOST RECENT SYSTOLIC BLOOD PRESS GE 130-139MM HG: ICD-10-PCS | Mod: CPTII,S$GLB,, | Performed by: INTERNAL MEDICINE

## 2023-05-08 PROCEDURE — 99999 PR PBB SHADOW E&M-EST. PATIENT-LVL III: ICD-10-PCS | Mod: PBBFAC,,, | Performed by: INTERNAL MEDICINE

## 2023-05-08 PROCEDURE — 3008F BODY MASS INDEX DOCD: CPT | Mod: CPTII,S$GLB,, | Performed by: INTERNAL MEDICINE

## 2023-05-08 PROCEDURE — 3008F PR BODY MASS INDEX (BMI) DOCUMENTED: ICD-10-PCS | Mod: CPTII,S$GLB,, | Performed by: INTERNAL MEDICINE

## 2023-05-08 PROCEDURE — 97110 THERAPEUTIC EXERCISES: CPT | Mod: PN

## 2023-05-08 PROCEDURE — 3078F DIAST BP <80 MM HG: CPT | Mod: CPTII,S$GLB,, | Performed by: INTERNAL MEDICINE

## 2023-05-08 PROCEDURE — 99999 PR PBB SHADOW E&M-EST. PATIENT-LVL III: CPT | Mod: PBBFAC,,, | Performed by: INTERNAL MEDICINE

## 2023-05-08 PROCEDURE — 99395 PR PREVENTIVE VISIT,EST,18-39: ICD-10-PCS | Mod: S$GLB,,, | Performed by: INTERNAL MEDICINE

## 2023-05-08 PROCEDURE — 99395 PREV VISIT EST AGE 18-39: CPT | Mod: S$GLB,,, | Performed by: INTERNAL MEDICINE

## 2023-05-08 PROCEDURE — 3075F SYST BP GE 130 - 139MM HG: CPT | Mod: CPTII,S$GLB,, | Performed by: INTERNAL MEDICINE

## 2023-05-08 NOTE — PROGRESS NOTES
"[facility]   Physical Therapy Treatment Note     Name: Evan Sousa  Clinic Number: [id number]    Therapy Diagnosis:   Encounter Diagnosis   Name Primary?    Decreased range of motion of both ankles Yes       Physician: Geraldo Tian DPM    Visit Date: 5/8/2023    Physician Orders: PT Eval and Treat   Medical Diagnosis:   M72.2 (ICD-10-CM) - Plantar fasciitis  M79.671,M79.672 (ICD-10-CM) - Foot pain, bilateral  M24.573 (ICD-10-CM) - Equinus contracture of ankle  Evaluation Date: 3/28/2023  Authorization Period Expiration: 3/22/2024  Plan of Care Certification Period: 5/23/2023  Visit # / Visits authorized: 5 (4/ 20)  FOTO: 2/ 5 (5/1/2023)  PTA Visit #: 0/ 5    Precautions: Standard    Time In: 8:28 am  Time Out: 9:15 am  Total Billable Time: 47 minutes    SUBJECTIVE   Pt reports: she is a little sore this morning. Did go to [place] and walk around this weekend.     She was compliant with home exercise program.  Response to previous treatment: "I felt so good after last session. My calf muscles were sore."  Functional change: less pain in the morning and after standing a while.    Pain: 3/10  Location: B heel    OBJECTIVE       AROM  LE MMT  R  L    Hip flexion  5/5  5/5    Hip abduction  4/5  5/5    Hip extension  5/5  5/5    Hip ER  5/5  5/5    Hip IR  5/5  5/5    Knee extension  5/5  5/5    Knee flexion  5/5  5/5    Ankle dorsiflexion  5/5  5/5    Ankle plantar flexion  5/5  5/5    Ankle inversion  5/5  5/5    Ankle eversion  5/5  5/5          Flexibility testing:  - hamstrings:           B: WNL  - gastrocnemius:     B: tight, R: 2 deg, L: 4 deg  - piriformis:              B: WNL  - quadriceps:           B: WNL  - hip adductors:      B: WNL  - hip flexors:           B: tight, R: decreased 25%,L: decreased 50%  - IT bands:             B: tight, decreased 50%     Joint mobility:                              R                     L  Ankle dorsiflexion                   2 deg     " "          4 deg  Ankle plantar flexion            60 deg               50 deg  Ankle inversion                    45 deg               55 deg  Ankle eversion                     30 deg               30 deg     CMS Impairment/Limitation/Restriction for FOTO Foot Survey     Therapist reviewed FOTO scores for Evan Sousa on 5/1/2023.   FOTO documents entered into [employer] - see Media section.     Limitation Score: 28%  Category: Mobility     Current : CJ = at least 20% but < 40% impaired, limited or restricted  Goal: CJ = at least 20% but < 40% impaired, limited or restricted     TREATMENT     Evan received the bolded treatments listed below:      Patient received therapeutic exercises for 25 minutes for improved strength and AROM including:  Toe spreads x 30 reps  Towel scrunches x 30 reps  Toe yoga x 30 reps  Soleus stretch with towel 3 x 30"  Gastroc stretch with towel 3 x 30"    +DL heel raise to SL eccentric lowering x 15 on black foam     Heel raises on small black 1/2 foam roll 3 x 12 reps  Hallux abduction off small black 1/2 foam roll x 20 reps  Hallux flexion with red theraband x 20 reps      Patient received manual therapeutic technique for minutes for improved soft tissue and joint mobility including:  IASTM scrapping B achilles and plantar fascia  AP Talocrural mob I-III  Talocrural distraction      Patient received neuromuscular reeducation for 20 minutes for improved proprioception and balance including:  NBOS, eyes opened, on foam 3 x 30"  NBOC, eyes closed, on foam 3 x 30"  Tandem 3 x 30" on foam  Hesitation marching 4 x 40 ft with 10#KB  Lateral stepping 4 x 40 ftwith 10#KB  +ankle flips 2 x 40 ft      Patient received therapeutic activities for 00 minutes for improved tolerance to functional activities including:        PATIENT EDUCATION AND HOME EXERCISES     Home Exercises Provided and Patient Education Provided     Education provided:   - Continuance of HEP    Written Home Exercises " Provided: Patient instructed to cont prior HEP. Exercises were reviewed and Evan was able to demonstrate them prior to the end of the session.  Evan demonstrated good  understanding of the education provided. See EMR under Patient Instructions for exercises provided during therapy sessions    ASSESSMENT   Continuued with B calf strengthening and balance training. Continuing to progress to below listed goals.    Evan Is progressing well towards her goals.   Pt prognosis is Good.     Pt will continue to benefit from skilled outpatient physical therapy to address the deficits listed in the problem list box on initial evaluation, provide pt/family education and to maximize pt's level of independence in the home and community environment.     Pt's spiritual, cultural and educational needs considered and pt agreeable to plan of care and goals.     Anticipated barriers to physical therapy: None    Goals:   Short Term Goals: 4 weeks   Independent with HEP. (Ongoing)  Report decreased B foot pain < or =  5/10 with adls such as walking, standing, sit to stand from the floor, and running. (progressing, not met)  Increased MMT for B LE by 1 muscle grade to promote proper pelvic stability to decrease B foot pain < or =  5/10 with adls such as walking, standing, sit to stand from the floor, and running.  (progressing, not met)  Increased B ankle dorsiflexion to 5 deg. (progressing, not met)     Long Term Goals: 8 weeks   Increased B ankle dorsiflexion to 10 deg (progressing, not met)  Report decreased B foot pain < or =  2/10 with adls such as walking, standing, sit to stand from the floor, and running. (progressing, not met)  Increased MMT for B LE by 1 muscle grade to promote proper pelvic stability to decrease B foot pain < or =  2/10 with adls such as walking, standing, sit to stand from the floor, and running.  (progressing, not met)  Increased flexibility in B hip flexors and B IT bands to promote proper pelvic  stability to decrease B foot pain < or =  2/10 with adls such as walking, standing, sit to stand from the floor, and running. (progressing, not met)  Patient to achieve CJ (at least 20% < 40% impaired, limited or restricted) level on the FOTO Outcomes Measurement System. (met)    PLAN   Certification Period/Plan of care expiration: 3/28/2023 to 5/23/2023.    Improve B LE strength and increase dorsiflexion      Outpatient Physical Therapy 1 times weekly for 8 weeks to include the following interventions: Manual Therapy, Moist Heat/ Ice, Neuromuscular Re-ed, Patient Education, Therapeutic Activities, Therapeutic Exercise, and Dry Needling.     Julian Galvez, PT

## 2023-05-08 NOTE — PROGRESS NOTES
Subjective:       Patient ID: Evan Sousa is a 28 y.o. female.    Chief Complaint: Follow-up    Ms. Sousa is a 26 yo female who presents for follow up.      Feeling well today.     Has noticed flesh colored papule on leg that appeared about 2-3 months ago.      PMHx:  Plantar Fascitis: seeing podiatry. Had steroid injection. No improved with PT   HLD: LD was 184 in June 2022. Since then she has been working on her diet and taking fiber and omega 3 and LDL now down to 121/      Health Maintenance:     HIV: negative 2022   Hep C: negative 2022  Lipids: improved on recent labs    Pap Smear: Done March 2021 and was normal. Due in 2024.   Vaccines: up to date        Follow-up  Pertinent negatives include no abdominal pain, arthralgias, chest pain, chills, coughing, headaches, myalgias, nausea or vomiting.   Review of Systems   Constitutional:  Negative for activity change, appetite change and chills.   HENT:  Negative for ear pain, sinus pressure/congestion and sneezing.    Respiratory:  Negative for cough and shortness of breath.    Cardiovascular:  Negative for chest pain, palpitations and leg swelling.   Gastrointestinal:  Negative for abdominal distention, abdominal pain, constipation, diarrhea, nausea and vomiting.   Genitourinary:  Negative for dysuria and hematuria.   Musculoskeletal:  Negative for arthralgias, back pain and myalgias.   Neurological:  Negative for dizziness and headaches.   Psychiatric/Behavioral:  Negative for agitation. The patient is not nervous/anxious.          Past Medical History:   Diagnosis Date    COVID-19     2020     No past surgical history on file.   Patient Active Problem List   Diagnosis    Near syncope    Reactive hypoglycemia    Mixed hyperlipidemia    Plantar fasciitis    Decreased range of motion of both ankles    Foot pain, bilateral    Equinus contracture of ankle        Objective:      Physical Exam  Constitutional:       Appearance: Normal appearance.   HENT:       Head: Normocephalic.      Right Ear: Tympanic membrane normal.      Left Ear: Tympanic membrane normal.      Nose: Nose normal.   Cardiovascular:      Rate and Rhythm: Normal rate and regular rhythm.      Pulses: Normal pulses.      Heart sounds: Normal heart sounds.   Pulmonary:      Effort: Pulmonary effort is normal.      Breath sounds: Normal breath sounds.   Abdominal:      General: Abdomen is flat. Bowel sounds are normal.      Palpations: Abdomen is soft.   Musculoskeletal:         General: Normal range of motion.      Cervical back: Normal range of motion and neck supple.   Skin:     General: Skin is warm and dry.   Neurological:      General: No focal deficit present.      Mental Status: She is alert and oriented to person, place, and time.   Psychiatric:         Mood and Affect: Mood normal.       Assessment:       Problem List Items Addressed This Visit          Cardiac/Vascular    Mixed hyperlipidemia     Other Visit Diagnoses       Encounter for annual physical exam    -  Primary    Dermatofibroma        Relevant Orders    Ambulatory referral/consult to Dermatology            Plan:         Evan was seen today for follow-up.    Diagnoses and all orders for this visit:    Encounter for annual physical exam  HIV: negative 2022   Hep C: negative 2022  Lipids: improved on recent labs    Pap Smear: Done March 2021 and was normal. Due in 2024.   Vaccines: up to date    Doing well overall. HLD much improved.     Dermatofibroma  -     Ambulatory referral/consult to Dermatology; Future    Mixed hyperlipidemia  LD was 184 in June 2022. Since then she has been working on her diet and taking fiber and omega 3 and LDL now down to 121/                Maria T Sierra MD   Internal Medicine   Primary Care

## 2023-05-12 NOTE — PROGRESS NOTES
"[facility]   Physical Therapy Treatment Note     Name: Evan Sousa  Clinic Number: [id number]    Therapy Diagnosis:   Encounter Diagnosis   Name Primary?    Decreased range of motion of both ankles Yes       Physician: Geraldo Tian DPM    Visit Date: 5/15/2023    Physician Orders: PT Eval and Treat   Medical Diagnosis:   M72.2 (ICD-10-CM) - Plantar fasciitis  M79.671,M79.672 (ICD-10-CM) - Foot pain, bilateral  M24.573 (ICD-10-CM) - Equinus contracture of ankle  Evaluation Date: 3/28/2023  Authorization Period Expiration: 3/22/2024  Plan of Care Certification Period: 5/23/2023  Visit # / Visits authorized: 6 (5/ 20)  FOTO: 3/ 5 (5/1/2023)  PTA Visit #: 1/ 5    Precautions: Standard    Time In: 4:00 pm  Time Out: 4:45 pm  Total Billable Time: 45 minutes    SUBJECTIVE   Pt reports: "It's there but not painful"    She was compliant with home exercise program.  Response to previous treatment: "I didn't have any pain for a few days after"  Functional change: less pain in the morning and after standing a while.    Pain: 2/10  Location: B heel L>R    OBJECTIVE     5/1/2023:  AROM  LE MMT  R  L    Hip flexion  5/5  5/5    Hip abduction  4/5  5/5    Hip extension  5/5  5/5    Hip ER  5/5  5/5    Hip IR  5/5  5/5    Knee extension  5/5  5/5    Knee flexion  5/5  5/5    Ankle dorsiflexion  5/5  5/5    Ankle plantar flexion  5/5  5/5    Ankle inversion  5/5  5/5    Ankle eversion  5/5  5/5          Flexibility testing:  - hamstrings:           B: WNL  - gastrocnemius:     B: tight, R: 2 deg, L: 4 deg  - piriformis:              B: WNL  - quadriceps:           B: WNL  - hip adductors:      B: WNL  - hip flexors:           B: tight, R: decreased 25%,L: decreased 50%  - IT bands:             B: tight, decreased 50%     Joint mobility:                              R                     L  Ankle dorsiflexion                   2 deg               4 deg  Ankle plantar flexion            60 deg     " "          50 deg  Ankle inversion                    45 deg               55 deg  Ankle eversion                     30 deg               30 deg     CMS Impairment/Limitation/Restriction for FOTO Foot Survey     Therapist reviewed FOTO scores for Evan Sousa on 5/1/2023.   FOTO documents entered into [employer] - see Media section.     Limitation Score: 28%  Category: Mobility     Current : CJ = at least 20% but < 40% impaired, limited or restricted  Goal: CJ = at least 20% but < 40% impaired, limited or restricted     TREATMENT     Evan performed the bolded treatments listed below:      Patient received therapeutic exercises for 20 minutes for improved strength and AROM including:  Toe spreads x 30 reps  Towel scrunches x 30 reps  Toe yoga x 30 reps  Soleus stretch with towel 3 x 30"  Gastroc stretch with towel 3 x 30"    DL heel raise to SL eccentric lowering x 15 on black foam     Heel raises on small black 1/2 foam roll 3 x 12 reps  Hallux abduction off small black 1/2 foam roll x 20 reps  Hallux flexion with red theraband x 20 reps      Patient received manual therapeutic technique for 00 minutes for improved soft tissue and joint mobility including:  IASTM scrapping B achilles and plantar fascia  AP Talocrural mob I-III  Talocrural distraction      Patient received neuromuscular reeducation for 25 minutes for improved proprioception and balance including:  NBOS, eyes opened, on foam 1 x 30"  NBOS, eyes closed, on foam 3 x 30"  Tandem on foam 3 x 30"   +SLS on foam 2 x 30"   Hesitation marching 4 x 40 ft with 10#KB  Lateral stepping 4 x 40 ft with 10#KB  Ankle flips 2 x 40 ft  +SL red ball toss on rebounder x 15 B      Patient received therapeutic activities for 00 minutes for improved tolerance to functional activities including:        PATIENT EDUCATION AND HOME EXERCISES     Home Exercises Provided and Patient Education Provided     Education provided:   - Continuance of HEP    Written Home Exercises " Provided: Patient instructed to cont prior HEP. Exercises were reviewed and Evan was able to demonstrate them prior to the end of the session.  Evan demonstrated good  understanding of the education provided. See EMR under Patient Instructions for exercises provided during therapy sessions    ASSESSMENT   Pt completed today's treatment with no adverse effects. Progressed balance today with good training effect noted.     Evan Is progressing well towards her goals.   Pt prognosis is Good.     Pt will continue to benefit from skilled outpatient physical therapy to address the deficits listed in the problem list box on initial evaluation, provide pt/family education and to maximize pt's level of independence in the home and community environment.     Pt's spiritual, cultural and educational needs considered and pt agreeable to plan of care and goals.     Anticipated barriers to physical therapy: None    Goals:   Short Term Goals: 4 weeks   Independent with HEP. (Ongoing)  Report decreased B foot pain < or =  5/10 with adls such as walking, standing, sit to stand from the floor, and running. (progressing, not met)  Increased MMT for B LE by 1 muscle grade to promote proper pelvic stability to decrease B foot pain < or =  5/10 with adls such as walking, standing, sit to stand from the floor, and running.  (progressing, not met)  Increased B ankle dorsiflexion to 5 deg. (progressing, not met)     Long Term Goals: 8 weeks   Increased B ankle dorsiflexion to 10 deg (progressing, not met)  Report decreased B foot pain < or =  2/10 with adls such as walking, standing, sit to stand from the floor, and running. (progressing, not met)  Increased MMT for B LE by 1 muscle grade to promote proper pelvic stability to decrease B foot pain < or =  2/10 with adls such as walking, standing, sit to stand from the floor, and running.  (progressing, not met)  Increased flexibility in B hip flexors and B IT bands to promote proper  pelvic stability to decrease B foot pain < or =  2/10 with adls such as walking, standing, sit to stand from the floor, and running. (progressing, not met)  Patient to achieve CJ (at least 20% < 40% impaired, limited or restricted) level on the FOTO Outcomes Measurement System. (met)    PLAN   Certification Period/Plan of care expiration: 3/28/2023 to 5/23/2023.    Improve B LE strength and increase dorsiflexion      Outpatient Physical Therapy 1 times weekly for 8 weeks to include the following interventions: Manual Therapy, Moist Heat/ Ice, Neuromuscular Re-ed, Patient Education, Therapeutic Activities, Therapeutic Exercise, and Dry Needling.     Jose Davis III, PTA

## 2023-05-15 ENCOUNTER — CLINICAL SUPPORT (OUTPATIENT)
Dept: REHABILITATION | Facility: OTHER | Age: 28
End: 2023-05-15
Payer: COMMERCIAL

## 2023-05-15 DIAGNOSIS — M25.672 DECREASED RANGE OF MOTION OF BOTH ANKLES: Primary | ICD-10-CM

## 2023-05-15 DIAGNOSIS — M25.671 DECREASED RANGE OF MOTION OF BOTH ANKLES: Primary | ICD-10-CM

## 2023-05-15 PROCEDURE — 97112 NEUROMUSCULAR REEDUCATION: CPT | Mod: PN,CQ

## 2023-05-15 PROCEDURE — 97110 THERAPEUTIC EXERCISES: CPT | Mod: PN,CQ

## 2023-05-29 ENCOUNTER — CLINICAL SUPPORT (OUTPATIENT)
Dept: REHABILITATION | Facility: OTHER | Age: 28
End: 2023-05-29
Payer: COMMERCIAL

## 2023-05-29 DIAGNOSIS — M25.672 DECREASED RANGE OF MOTION OF BOTH ANKLES: Primary | ICD-10-CM

## 2023-05-29 DIAGNOSIS — M79.671 FOOT PAIN, BILATERAL: ICD-10-CM

## 2023-05-29 DIAGNOSIS — M25.671 DECREASED RANGE OF MOTION OF BOTH ANKLES: Primary | ICD-10-CM

## 2023-05-29 DIAGNOSIS — M79.672 FOOT PAIN, BILATERAL: ICD-10-CM

## 2023-05-29 DIAGNOSIS — M24.573 EQUINUS CONTRACTURE OF ANKLE: ICD-10-CM

## 2023-05-29 PROCEDURE — 97110 THERAPEUTIC EXERCISES: CPT | Mod: PN

## 2023-05-29 PROCEDURE — 97140 MANUAL THERAPY 1/> REGIONS: CPT | Mod: PN

## 2023-05-30 NOTE — PLAN OF CARE
"[facility]  Physical Therapy Updated Plan of Care    Visit Date: 5/29/2023  Name: Evan Sousa  Clinic Number: [id number]    Therapy Diagnosis:   Encounter Diagnoses   Name Primary?    Decreased range of motion of both ankles Yes    Foot pain, bilateral     Equinus contracture of ankle      Physician: Geraldo Tian DPM    Physician Orders: PT Eval and Treat   Medical Diagnosis:   M72.2 (ICD-10-CM) - Plantar fasciitis  M79.671,M79.672 (ICD-10-CM) - Foot pain, bilateral  M24.573 (ICD-10-CM) - Equinus contracture of ankle  Evaluation Date: 3/28/2023    Total Visits Received: 7  Cancelled Visits: 3  No Show Visits: 0    Current Certification Period:  3/28/2023 to 5/23/2023  Precautions: Standard  Visits from Evaluation Date:  6  Functional Level Prior to Evaluation:  I with amb and ADL, but pain increases with standing, sit to stand from the floor    Time In: 4:00 pm  Time Out: 4:55 pm  Total Billable Time: 45 minutes    Subjective     Update:     Pt reports: she is feeling better. Slowly getting there. States once every 10 days to 3 weeks she can have her max pain. States she is still limited with running. Can walk at a leisurely pace, but not power walk on the treadmill.    She was compliant with home exercise program.  Response to previous treatment: "I didn't have any pain for a few days after"  Functional change: getting out of the bed is not as painful. Can walk more.     Pain: 2/10. At worst 4/10  Location: B heel L>R    Objective     Update:     LE MMT  R  L    Hip flexion  5/5  5/5    Hip abduction  5/5  5/5    Hip extension  5/5  5/5    Hip ER  5/5  5/5    Hip IR  5/5  5/5    Knee extension  5/5  5/5    Knee flexion  5/5  5/5    Ankle dorsiflexion  5/5  5/5    Ankle plantar flexion  5/5  5/5    Ankle inversion  5/5  5/5    Ankle eversion  5/5  5/5          Flexibility testing:  - hamstrings:           B: WNL  - gastrocnemius:     B: tight, R: 5 deg, L: 4 deg  - piriformis:  "             B: WNL  - quadriceps:           B: WNL  - hip adductors:      B: WNL  - hip flexors:           B: tight, decreased 25%  - IT bands:             B: WFL     Joint mobility:                              R                     L  Ankle dorsiflexion                  5 deg                 5 deg  Ankle plantar flexion            75 deg               50 deg  Ankle inversion                    52 deg               70  deg  Ankle eversion                     30 deg               30 deg     CMS Impairment/Limitation/Restriction for FOTO Foot Survey     Therapist reviewed FOTO scores for Evan Sousa on 5/29/2023.   FOTO documents entered into [employer] - see Media section.     Limitation Score:35 %  Category: Mobility     Current : CJ = at least 20% but < 40% impaired, limited or restricted  Goal: CJ = at least 20% but < 40% impaired, limited or restricted       Evan received therapeutic exercises for 20 minutes for improved strength and AROM including:  Reassessed strength, flexibility, ROM, and functional limitation    Evan received manual therapeutic technique for 25 minutes for improved soft tissue and joint mobility including:    Application of TDN: Pt educated on benefits and potential side effects of dry needling. Educated pt on benefits, precautions, side effects following TDN. Educated pt to use heat following treatment sessions if pt is experiencing pain or soreness. Pt verbalized good understanding of education.  Pt signed written consent to dry needling. Pt gave verbal consent for DN    Pt received dry needling to the below listed muscles using 15 mm, 30 mm, 40 mm, and 50 mm needles.  B medial and lateral Gastroc  B soleus  B Achilles tendon (proximal and lateral)  B MCT  B quadratus plantae  B adductor hallucis  B adductor hallucis    Winding performed every 5 minutes during treatment.       Evan received hot pack to B calf x 10 minutes following dry needling.    Home Exercises Provided and  Patient Education Provided     Education provided:   - benefits and risks of dry needling reviewed with patient      Assessment     Update:     Initiated dry needling today. Will combine with electrical stimulation next visit.    Evan Is progressing well towards her goals.   Pt prognosis is Good.     Pt will continue to benefit from skilled outpatient physical therapy to address the deficits listed in the problem list box on initial evaluation, provide pt/family education and to maximize pt's level of independence in the home and community environment.     Pt's spiritual, cultural and educational needs considered and pt agreeable to plan of care and goals.     Anticipated barriers to physical therapy: None      Previous Short Term Goals Status:     Short Term Goals: 4 weeks   Independent with HEP. (Ongoing)  Report decreased B foot pain < or =  5/10 with adls such as walking, standing, sit to stand from the floor, and running. (met)  Increased MMT for B LE by 1 muscle grade to promote proper pelvic stability to decrease B foot pain < or =  5/10 with adls such as walking, standing, sit to stand from the floor, and running.  (progressing, not met for running)  Increased B ankle dorsiflexion to 5 deg. (progressing, not met)     Long Term Goals: 8 weeks   Increased B ankle dorsiflexion to 10 deg (progressing, not met)  Report decreased B foot pain < or =  2/10 with adls such as walking, standing, sit to stand from the floor, and running. (progressing, not met)  Increased MMT for B LE by 1 muscle grade to promote proper pelvic stability to decrease B foot pain < or =  2/10 with adls such as walking, standing, sit to stand from the floor, and running.  (progressing, not met)  Increased flexibility in B hip flexors and B IT bands to promote proper pelvic stability to decrease B foot pain < or =  2/10 with adls such as walking, standing, sit to stand from the floor, and running. (progressing, not met)  Patient to achieve  CJ (at least 20% < 40% impaired, limited or restricted) level on the FOTO Outcomes Measurement System. (met)    Long Term Goal Status:   continue per initial plan of care.  Reasons for Recertification of Therapy:   Patient has progressed in OP PT with increased strength and decreased pain with functional movements. Still experiencing decreased ankle dorsiflexion. Will proceed in OP PT with dry needling and progression to return to running.    Plan     Updated Certification Period: 5/29/2023 to 7/24/2023  Recommended Treatment Plan: 1 times per week for 8 weeks: Gait Training, Manual Therapy, Moist Heat/ Ice, Neuromuscular Re-ed, Patient Education, Therapeutic Activities, Therapeutic Exercise, and Dry Needling      Julian Galvez, PT  5/29/2023      I CERTIFY THE NEED FOR THESE SERVICES FURNISHED UNDER THIS PLAN OF TREATMENT AND WHILE UNDER MY CARE    Physician's comments:        Physician's Signature: ___________________________________________________

## 2023-06-02 NOTE — PROGRESS NOTES
[facility]   Physical Therapy Treatment Note     Name: Evan Sousa  Clinic Number: [id number]    Therapy Diagnosis:   Encounter Diagnosis   Name Primary?    Decreased range of motion of both ankles Yes       Physician: Geraldo Tian DPM    Visit Date: 6/5/2023    Physician Orders: PT Eval and Treat   Medical Diagnosis:   M72.2 (ICD-10-CM) - Plantar fasciitis  M79.671,M79.672 (ICD-10-CM) - Foot pain, bilateral  M24.573 (ICD-10-CM) - Equinus contracture of ankle  Evaluation Date: 3/28/2023  Authorization Period Expiration: 3/22/2024  Plan of Care Certification Period: 5/23/2023  Visit # / Visits authorized: 8 (7/ 20)  FOTO: 1/ 5 (Last issued on 5/29/2023)    PTA Visit #: 1/ 5    Precautions: Standard    Time In: 3:15 pm  Time Out: 3:57 pm  Total Billable Time: 42 minutes    SUBJECTIVE   Pt reports: Hasn't had any pain in the R heel since dry needling and the pain in L heel has decreased, but it's still there     She was compliant with home exercise program.  Response to previous treatment: No soreness  Functional change: getting out of the bed is not as painful. Can walk more.      Pain: 3/10; 4-5/10 at worse  Location: L heel    OBJECTIVE     Updated 5/29/2023:      LE MMT  R  L    Hip flexion  5/5  5/5    Hip abduction  5/5  5/5    Hip extension  5/5  5/5    Hip ER  5/5  5/5    Hip IR  5/5  5/5    Knee extension  5/5  5/5    Knee flexion  5/5  5/5    Ankle dorsiflexion  5/5  5/5    Ankle plantar flexion  5/5  5/5    Ankle inversion  5/5  5/5    Ankle eversion  5/5  5/5          Flexibility testing:  - hamstrings:           B: WNL  - gastrocnemius:     B: tight, R: 5 deg, L: 4 deg  - piriformis:              B: WNL  - quadriceps:           B: WNL  - hip adductors:      B: WNL  - hip flexors:           B: tight, decreased 25%  - IT bands:             B: WFL     Joint mobility:                              R                     L  Ankle dorsiflexion                  5 deg            "      5 deg  Ankle plantar flexion            75 deg               50 deg  Ankle inversion                    52 deg               70  deg  Ankle eversion                     30 deg               30 deg     CMS Impairment/Limitation/Restriction for FOTO Foot Survey     Therapist reviewed FOTO scores for Evan Sousa on 5/29/2023.   FOTO documents entered into [employer] - see Media section.     Limitation Score:35 %  Category: Mobility     Current : CJ = at least 20% but < 40% impaired, limited or restricted  Goal: CJ = at least 20% but < 40% impaired, limited or restricted     TREATMENT     Evan performed the bolded treatments listed below:      Patient received therapeutic exercises for 00 minutes for improved strength and AROM including:  Toe spreads x 30 reps  Towel scrunches x 30 reps  Toe yoga x 30 reps  Soleus stretch with towel 3 x 30"  Gastroc stretch with towel 3 x 30"    DL heel raise to SL eccentric lowering x 15 on black foam     Heel raises on small black 1/2 foam roll 3 x 12 reps  Hallux abduction off small black 1/2 foam roll x 20 reps  Hallux flexion with red theraband x 20 reps      Patient received manual therapeutic technique for 00 minutes for improved soft tissue and joint mobility including:  IASTM scrapping B achilles and plantar fascia  AP Talocrural mob I-III  Talocrural distraction    Application of TDN: Pt educated on benefits and potential side effects of dry needling. Educated pt on benefits, precautions, side effects following TDN. Educated pt to use heat following treatment sessions if pt is experiencing pain or soreness. Pt verbalized good understanding of education.  Pt signed written consent to dry needling. Pt gave verbal consent for DN     Pt received dry needling to the below listed muscles using 15 mm, 30 mm, 40 mm, and 50 mm needles.  B medial and lateral Gastroc  B soleus  B Achilles tendon (proximal and lateral)  B MCT  B quadratus plantae  B adductor hallucis  B " "adductor hallucis     Winding performed every 5 minutes during treatment.       Patient received neuromuscular reeducation for 42 minutes for improved proprioception and balance including:  NBOS, eyes opened, on foam 1 x 30"  NBOS, eyes closed, on foam 3 x 30"  Tandem on foam 3 x 30"   SLS on foam 2 x 30"   Hesitation marching 4 x 40 ft with 10#KB  Lateral stepping 4 x 40 ft with 10#KB  Ankle flips 2 x 40 ft  SL red ball toss on rebounder forward/lateral x 15 B  +Tandem walk on foam x 4 laps  +Tandem walk 20ft x 2      Patient received therapeutic activities for 00 minutes for improved tolerance to functional activities including:      Evan received hot pack to B calf x 00 minutes following dry needling.      PATIENT EDUCATION AND HOME EXERCISES     Home Exercises Provided and Patient Education Provided     Education provided:   - Continuance of HEP    Written Home Exercises Provided: Patient instructed to cont prior HEP. Exercises were reviewed and Evan was able to demonstrate them prior to the end of the session.  Evan demonstrated good  understanding of the education provided. See EMR under Patient Instructions for exercises provided during therapy sessions    ASSESSMENT   Pt tolerated treatment well today with no increase in symptoms. Additional dynamic balance activities added today with noted ankle fatigue.     Evan Is progressing well towards her goals.   Pt prognosis is Good.     Pt will continue to benefit from skilled outpatient physical therapy to address the deficits listed in the problem list box on initial evaluation, provide pt/family education and to maximize pt's level of independence in the home and community environment.     Pt's spiritual, cultural and educational needs considered and pt agreeable to plan of care and goals.     Anticipated barriers to physical therapy: None       Previous Short Term Goals Status:     Short Term Goals: 4 weeks   Independent with HEP. (Ongoing)  Report " decreased B foot pain < or =  5/10 with adls such as walking, standing, sit to stand from the floor, and running. (met)  Increased MMT for B LE by 1 muscle grade to promote proper pelvic stability to decrease B foot pain < or =  5/10 with adls such as walking, standing, sit to stand from the floor, and running.  (progressing, not met for running)  Increased B ankle dorsiflexion to 5 deg. (progressing, not met)     Long Term Goals: 8 weeks   Increased B ankle dorsiflexion to 10 deg (progressing, not met)  Report decreased B foot pain < or =  2/10 with adls such as walking, standing, sit to stand from the floor, and running. (progressing, not met)  Increased MMT for B LE by 1 muscle grade to promote proper pelvic stability to decrease B foot pain < or =  2/10 with adls such as walking, standing, sit to stand from the floor, and running.  (progressing, not met)  Increased flexibility in B hip flexors and B IT bands to promote proper pelvic stability to decrease B foot pain < or =  2/10 with adls such as walking, standing, sit to stand from the floor, and running. (progressing, not met)  Patient to achieve CJ (at least 20% < 40% impaired, limited or restricted) level on the FOTO Outcomes Measurement System. (met)     Long Term Goal Status:   continue per initial plan of care.  Reasons for Recertification of Therapy:   Patient has progressed in OP PT with increased strength and decreased pain with functional movements. Still experiencing decreased ankle dorsiflexion. Will proceed in OP PT with dry needling and progression to return to running.  PLAN   Updated Certification Period: 5/29/2023 to 7/24/2023    Improve B LE strength and increase dorsiflexion     Recommended Treatment Plan: 1 times per week for 8 weeks: Gait Training, Manual Therapy, Moist Heat/ Ice, Neuromuscular Re-ed, Patient Education, Therapeutic Activities, Therapeutic Exercise, and Dry Needling      Jose Davis III, PTA

## 2023-06-05 ENCOUNTER — CLINICAL SUPPORT (OUTPATIENT)
Dept: REHABILITATION | Facility: OTHER | Age: 28
End: 2023-06-05
Payer: COMMERCIAL

## 2023-06-05 DIAGNOSIS — M25.672 DECREASED RANGE OF MOTION OF BOTH ANKLES: Primary | ICD-10-CM

## 2023-06-05 DIAGNOSIS — M25.671 DECREASED RANGE OF MOTION OF BOTH ANKLES: Primary | ICD-10-CM

## 2023-06-05 PROCEDURE — 97112 NEUROMUSCULAR REEDUCATION: CPT | Mod: PN,CQ

## 2023-06-08 NOTE — PROGRESS NOTES
"[facility]   Physical Therapy Treatment Note     Name: Evan Sousa  Clinic Number: [id number]    Therapy Diagnosis:   Encounter Diagnosis   Name Primary?    Decreased range of motion of both ankles Yes     Physician: Geraldo Tian DPM    Visit Date: 6/12/2023    Physician Orders: PT Eval and Treat   Medical Diagnosis:   M72.2 (ICD-10-CM) - Plantar fasciitis  M79.671,M79.672 (ICD-10-CM) - Foot pain, bilateral  M24.573 (ICD-10-CM) - Equinus contracture of ankle  Evaluation Date: 3/28/2023  Authorization Period Expiration: 3/22/2024  Plan of Care Certification Period: 5/29/2023 to 7/24/2023  Visit # / Visits authorized: 9 (10/ 20)  FOTO: 2/ 5 (Last issued on 5/29/2023)    PTA Visit #: 0/ 5    Precautions: Standard    Time In: 10:02 am   Time Out: 10:48 am   Total Billable Time: 45 minutes    SUBJECTIVE   Pt reports: she went to the beach this weekend with her boyfriend and did a lot of activity.   The right side is feeling pretty good.  The left is the one that is more bothersome. She feels like the dry needling really helped the last time she had that done - she didn't feel like she had as much pain as before.       She was compliant with home exercise program.  Response to previous treatment: No soreness  Functional change: getting out of the bed is not as painful. Can walk more.      Pain: 4/10; (1/10 on right)   Location: L heel    OBJECTIVE     Objective measures updated at progress report unless otherwise noted.      TREATMENT     Evan performed the bolded treatments listed below:      Patient received therapeutic exercises for 3 minutes for improved strength and AROM including:  Gastroc/soleus slantboard stretch x90 seconds each     Toe spreads x 30 reps  Towel scrunches x 30 reps  Toe yoga x 30 reps  Soleus stretch with towel 3 x 30"  Gastroc stretch with towel 3 x 30"    DL heel raise to SL eccentric lowering x 15 on black foam     Heel raises on small black 1/2 foam " "roll 3 x 12 reps  Hallux abduction off small black 1/2 foam roll x 20 reps  Hallux flexion with red theraband x 20 reps      Patient received manual therapeutic technique for 35 minutes for improved soft tissue and joint mobility including:  IASTM scrapping B achilles and plantar fascia  AP Talocrural mob I-III  Talocrural distraction    Application of TDN: Pt educated on benefits and potential side effects of dry needling. Educated pt on benefits, precautions, side effects following TDN. Educated pt to use heat following treatment sessions if pt is experiencing pain or soreness. Pt verbalized good understanding of education.  Pt signed written consent to dry needling. Pt gave verbal consent for DN     Pt received dry needling to the below listed muscles using 15 mm, 30 mm, 40 mm, and 50 mm needles.  B medial and lateral Gastroc  B soleus  B Achilles tendon (proximal and lateral)  B MCT  B quadratus plantae  B adductor hallucis  B abductor hallucis     Winding performed every 5 minutes during treatment.       Patient received neuromuscular reeducation for 8 minutes for improved proprioception and balance including:  NBOS, eyes opened, on foam 1 x 30"  NBOS, eyes closed, on foam 3 x 30"  Tandem on foam 3 x 30"   SLS on foam 2 x 30"   Hesitation marching 2x 10 yds each with 15#KB  Lateral stepping 2x 30 yds with 10#KB  Ankle flips 2 x 10 yds each with 15# kettle bell  SL red ball toss on rebounder forward/lateral x 15 B  +Tandem walk on foam x 4 laps  +Tandem walk 20ft x 2      Patient received therapeutic activities for 00 minutes for improved tolerance to functional activities including:      Evan received hot pack to B calf x 00 minutes following dry needling.      PATIENT EDUCATION AND HOME EXERCISES     Home Exercises Provided and Patient Education Provided     Education provided:   - Continuance of HEP    Written Home Exercises Provided: Patient instructed to cont prior HEP. Exercises were reviewed and Evan " was able to demonstrate them prior to the end of the session.  Evan demonstrated good  understanding of the education provided. See EMR under Patient Instructions for exercises provided during therapy sessions    ASSESSMENT   Resumed needling with left side more tight and had gastroc twitch response compared to right.  Good soft tissue response to dry needling evident by increased grasp with unilateral winding at all insertion points. Winding performed every 5 minutes during treatment. No adverse effects following treatment.     Evan Is progressing well towards her goals.   Pt prognosis is Good.     Pt will continue to benefit from skilled outpatient physical therapy to address the deficits listed in the problem list box on initial evaluation, provide pt/family education and to maximize pt's level of independence in the home and community environment.     Pt's spiritual, cultural and educational needs considered and pt agreeable to plan of care and goals.     Anticipated barriers to physical therapy: None       Previous Short Term Goals Status:     Short Term Goals: 4 weeks   Independent with HEP. (Ongoing)  Report decreased B foot pain < or =  5/10 with adls such as walking, standing, sit to stand from the floor, and running. (met)  Increased MMT for B LE by 1 muscle grade to promote proper pelvic stability to decrease B foot pain < or =  5/10 with adls such as walking, standing, sit to stand from the floor, and running.  (progressing, not met for running)  Increased B ankle dorsiflexion to 5 deg. (progressing, not met)     Long Term Goals: 8 weeks   Increased B ankle dorsiflexion to 10 deg (progressing, not met)  Report decreased B foot pain < or =  2/10 with adls such as walking, standing, sit to stand from the floor, and running. (progressing, not met)  Increased MMT for B LE by 1 muscle grade to promote proper pelvic stability to decrease B foot pain < or =  2/10 with adls such as walking, standing, sit to  stand from the floor, and running.  (progressing, not met)  Increased flexibility in B hip flexors and B IT bands to promote proper pelvic stability to decrease B foot pain < or =  2/10 with adls such as walking, standing, sit to stand from the floor, and running. (progressing, not met)  Patient to achieve CJ (at least 20% < 40% impaired, limited or restricted) level on the FOTO Outcomes Measurement System. (met)     PLAN   Updated Certification Period: 5/29/2023 to 7/24/2023    Improve B LE strength and increase dorsiflexion     Recommended Treatment Plan: 1 times per week for 8 weeks: Gait Training, Manual Therapy, Moist Heat/ Ice, Neuromuscular Re-ed, Patient Education, Therapeutic Activities, Therapeutic Exercise, and Dry Needling      Sabra Apple, PT

## 2023-06-12 ENCOUNTER — CLINICAL SUPPORT (OUTPATIENT)
Dept: REHABILITATION | Facility: OTHER | Age: 28
End: 2023-06-12
Payer: COMMERCIAL

## 2023-06-12 DIAGNOSIS — M25.671 DECREASED RANGE OF MOTION OF BOTH ANKLES: Primary | ICD-10-CM

## 2023-06-12 DIAGNOSIS — M25.672 DECREASED RANGE OF MOTION OF BOTH ANKLES: Primary | ICD-10-CM

## 2023-06-12 PROCEDURE — 97140 MANUAL THERAPY 1/> REGIONS: CPT | Mod: PN

## 2023-06-12 PROCEDURE — 97112 NEUROMUSCULAR REEDUCATION: CPT | Mod: PN

## 2023-06-16 NOTE — PROGRESS NOTES
"[facility]   Physical Therapy Treatment Note     Name: Evan Sousa  Clinic Number: [id number]    Therapy Diagnosis:   Encounter Diagnosis   Name Primary?    Decreased range of motion of both ankles Yes     Physician: Geraldo Tian DPM    Visit Date: 6/19/2023    Physician Orders: PT Eval and Treat   Medical Diagnosis:   M72.2 (ICD-10-CM) - Plantar fasciitis  M79.671,M79.672 (ICD-10-CM) - Foot pain, bilateral  M24.573 (ICD-10-CM) - Equinus contracture of ankle  Evaluation Date: 3/28/2023  Authorization Period Expiration: 3/22/2024  Plan of Care Certification Period: 5/29/2023 to 7/24/2023  Visit # / Visits authorized: 10 (9/ 20)  FOTO: 3/ 5 (Last issued on 5/29/2023)    PTA Visit #: 1/ 5    Precautions: Standard    Time In: 10:47 am   Time Out: 11:36 am   Total Billable Time: 41 minutes    SUBJECTIVE   Pt reports: Left heel is still bothering her and the right one feel fine.      She was compliant with home exercise program.  Response to previous treatment: No change  Functional change: getting out of the bed is not as painful. Can walk more.      Pain: 4/10; (0/10 on right)   Location: L heel    OBJECTIVE     Objective measures updated at progress report unless otherwise noted.      TREATMENT     Evan performed the bolded treatments listed below:      Patient received therapeutic exercises for 8 minutes for improved strength and AROM including:  Gastroc/soleus slantboard stretch x90 seconds each     Toe spreads x 30 reps  Towel scrunches x 30 reps  Toe yoga x 30 reps  Soleus stretch with towel 3 x 30"  Gastroc stretch on incline 3 x 30"    DL heel raise to SL eccentric lowering x 15 on black foam     Heel raises on 1/2 foam roll 3 x 10 reps  Hallux abduction off small black 1/2 foam roll x 20 reps  Hallux flexion with red theraband x 20 reps      Patient received manual therapeutic technique for 17 minutes for improved soft tissue and joint mobility including:  IASTM scrapping " "B achilles and plantar fascia  AP Talocrural mob I-III  Talocrural distraction    Application of TDN: Pt educated on benefits and potential side effects of dry needling. Educated pt on benefits, precautions, side effects following TDN. Educated pt to use heat following treatment sessions if pt is experiencing pain or soreness. Pt verbalized good understanding of education.  Pt signed written consent to dry needling. Pt gave verbal consent for DN     Pt received dry needling to the below listed muscles using 15 mm, 30 mm, 40 mm, and 50 mm needles.  B medial and lateral Gastroc  B soleus  B Achilles tendon (proximal and lateral)  B MCT  B quadratus plantae  B adductor hallucis  B abductor hallucis     Winding performed every 5 minutes during treatment.       Patient received neuromuscular reeducation for 12 minutes for improved proprioception and balance including:  NBOS, eyes opened, on foam 1 x 30"  NBOS, eyes closed, on foam 3 x 30"  Tandem on foam 3 x 30"   SLS on foam 2 x 30"   Hesitation marching 4 x 10 yds each with 15#KB  Lateral stepping 2x 30 yds with 10#KB  Ankle flips 4 x 10 yds each with 15# kettle bell  SL red ball toss on rebounder forward/lateral x 15 B  Tandem walk on foam x 4 laps  +Tandem walk 20ft x 2      Patient received therapeutic activities for 4 minutes for improved tolerance to functional activities including:  +Prancing on shuttle 1 black band x 40    Evan received hot pack to B calf x 00 minutes following dry needling.      PATIENT EDUCATION AND HOME EXERCISES     Home Exercises Provided and Patient Education Provided     Education provided:   - Continuance of HEP    Written Home Exercises Provided: Patient instructed to cont prior HEP. Exercises were reviewed and Evan was able to demonstrate them prior to the end of the session.  Evan demonstrated good  understanding of the education provided. See EMR under Patient Instructions for exercises provided during therapy " sessions    ASSESSMENT   Pt tolerated treatment well, but experienced increasing L heel pain with marches and ankle flips. Ended session with IASEDITA scraping, with favorable response and a decrease in symptoms. Included prancing on shuttle today as a way to progress back to running. No pain with prancing reported.    Evan Is progressing well towards her goals.   Pt prognosis is Good.     Pt will continue to benefit from skilled outpatient physical therapy to address the deficits listed in the problem list box on initial evaluation, provide pt/family education and to maximize pt's level of independence in the home and community environment.     Pt's spiritual, cultural and educational needs considered and pt agreeable to plan of care and goals.     Anticipated barriers to physical therapy: None       Previous Short Term Goals Status:     Short Term Goals: 4 weeks   Independent with HEP. (Ongoing)  Report decreased B foot pain < or =  5/10 with adls such as walking, standing, sit to stand from the floor, and running. (met)  Increased MMT for B LE by 1 muscle grade to promote proper pelvic stability to decrease B foot pain < or =  5/10 with adls such as walking, standing, sit to stand from the floor, and running.  (progressing, not met for running)  Increased B ankle dorsiflexion to 5 deg. (progressing, not met)     Long Term Goals: 8 weeks   Increased B ankle dorsiflexion to 10 deg (progressing, not met)  Report decreased B foot pain < or =  2/10 with adls such as walking, standing, sit to stand from the floor, and running. (progressing, not met)  Increased MMT for B LE by 1 muscle grade to promote proper pelvic stability to decrease B foot pain < or =  2/10 with adls such as walking, standing, sit to stand from the floor, and running.  (progressing, not met)  Increased flexibility in B hip flexors and B IT bands to promote proper pelvic stability to decrease B foot pain < or =  2/10 with adls such as walking,  standing, sit to stand from the floor, and running. (progressing, not met)  Patient to achieve CJ (at least 20% < 40% impaired, limited or restricted) level on the FOTO Outcomes Measurement System. (met)     PLAN   Updated Certification Period: 5/29/2023 to 7/24/2023    Improve B LE strength and increase dorsiflexion     Recommended Treatment Plan: 1 times per week for 8 weeks: Gait Training, Manual Therapy, Moist Heat/ Ice, Neuromuscular Re-ed, Patient Education, Therapeutic Activities, Therapeutic Exercise, and Dry Needling      Jose Davis III, PTA

## 2023-06-19 ENCOUNTER — CLINICAL SUPPORT (OUTPATIENT)
Dept: REHABILITATION | Facility: OTHER | Age: 28
End: 2023-06-19
Payer: COMMERCIAL

## 2023-06-19 DIAGNOSIS — M25.672 DECREASED RANGE OF MOTION OF BOTH ANKLES: Primary | ICD-10-CM

## 2023-06-19 DIAGNOSIS — M25.671 DECREASED RANGE OF MOTION OF BOTH ANKLES: Primary | ICD-10-CM

## 2023-06-19 PROCEDURE — 97112 NEUROMUSCULAR REEDUCATION: CPT | Mod: PN,CQ

## 2023-06-19 PROCEDURE — 97140 MANUAL THERAPY 1/> REGIONS: CPT | Mod: PN,CQ

## 2023-06-19 PROCEDURE — 97110 THERAPEUTIC EXERCISES: CPT | Mod: PN,CQ

## 2023-06-26 ENCOUNTER — CLINICAL SUPPORT (OUTPATIENT)
Dept: REHABILITATION | Facility: OTHER | Age: 28
End: 2023-06-26
Payer: COMMERCIAL

## 2023-06-26 DIAGNOSIS — M25.671 DECREASED RANGE OF MOTION OF BOTH ANKLES: Primary | ICD-10-CM

## 2023-06-26 DIAGNOSIS — M25.672 DECREASED RANGE OF MOTION OF BOTH ANKLES: Primary | ICD-10-CM

## 2023-06-26 PROCEDURE — 97112 NEUROMUSCULAR REEDUCATION: CPT | Mod: PN

## 2023-06-26 PROCEDURE — 97530 THERAPEUTIC ACTIVITIES: CPT | Mod: PN

## 2023-06-26 PROCEDURE — 97110 THERAPEUTIC EXERCISES: CPT | Mod: PN

## 2023-06-26 PROCEDURE — 97140 MANUAL THERAPY 1/> REGIONS: CPT | Mod: PN

## 2023-06-26 NOTE — PROGRESS NOTES
"[facility]   Physical Therapy Treatment Note     Name: Evan Sousa  Clinic Number: [id number]    Therapy Diagnosis:   Encounter Diagnosis   Name Primary?    Decreased range of motion of both ankles Yes       Physician: Geraldo Tian DPM    Visit Date: 6/26/2023    Physician Orders: PT Eval and Treat   Medical Diagnosis:   M72.2 (ICD-10-CM) - Plantar fasciitis  M79.671,M79.672 (ICD-10-CM) - Foot pain, bilateral  M24.573 (ICD-10-CM) - Equinus contracture of ankle  Evaluation Date: 3/28/2023  Authorization Period Expiration: 3/22/2024  Plan of Care Certification Period: 5/29/2023 to 7/24/2023  Visit # / Visits authorized: 11 (10/ 20)  FOTO: 1/ 5 (Last issued on 6/26/2023)    PTA Visit #: 0/ 5    Precautions: Standard    Time In: 10:00 am   Time Out: 10:47 am   Total Billable Time:  45 minutes    SUBJECTIVE   Pt reports: her R foot is not bothering her, it is her L foot. Max pain in L foot is 5/10 and can occur daily.     She was compliant with home exercise program.  Response to previous treatment: No change  Functional change: getting out of the bed is not as painful. Can walk more.      Pain: 5/10; (0/10 on right)     Location: L heel    OBJECTIVE     Objective measures updated at progress report unless otherwise noted.      CMS Impairment/Limitation/Restriction for FOTO Ankle Survey    Therapist reviewed FOTO scores for Evan Sousa on 6/26/2023.   FOTO documents entered into [facility] - see Media section.    Limitation Score: 44%  Category: Mobility    Current : CK = at least 40% but < 60% impaired, limited or restricted  Goal: CJ = at least 20% but < 40% impaired, limited or restricted       TREATMENT     Evan performed the bolded treatments listed below:      Patient received therapeutic exercises for 10 minutes for improved strength and AROM including:    Toe spreads x 30 reps  Towel scrunches x 30 reps  Toe yoga x 30 reps  Soleus stretch on incline x 90"  Gastroc " "stretch on incline x 90"    DL heel raise to SL eccentric lowering x 15 on step    Heel raises on 1/2 foam roll 3 x 10 reps  Hallux abduction off small black 1/2 foam roll x 20 reps  Hallux flexion with red theraband x 20 reps      Patient received manual therapeutic technique for 25 minutes for improved soft tissue and joint mobility including:  IASTM scrapping B achilles and plantar fascia  AP Talocrural mob I-III  Talocrural distraction    Application of TDN: Pt educated on benefits and potential side effects of dry needling. Educated pt on benefits, precautions, side effects following TDN. Educated pt to use heat following treatment sessions if pt is experiencing pain or soreness. Pt verbalized good understanding of education.  Pt signed written consent to dry needling. Pt gave verbal consent for DN     Pt received dry needling to the below listed muscles using 15 mm, 30 mm, 40 mm, and 50 mm needles.  B medial and lateral Gastroc  B soleus  B Achilles tendon (proximal and lateral)  B MCT  B quadratus plantae  B adductor hallucis  B abductor hallucis     Winding performed every 5 minutes during treatment.       Patient received neuromuscular reeducation for 10 minutes for improved proprioception and balance including:  NBOS, eyes opened, on foam 1 x 30"  NBOS, eyes closed, on foam 3 x 30"  Tandem on foam 3 x 30"   SLS on foam 2 x 30"   Hesitation marching 4 x 10 yds each with 15#KB  Lateral stepping 2x 30 yds with 10#KB  Ankle flips 4 x 10 yds each with 15# kettle bell  SL red ball toss on rebounder forward/lateral x 15 B  Tandem walk on foam x 4 laps  +Tandem walk 20ft x 2      Patient received therapeutic activities for 00 minutes for improved tolerance to functional activities including:  Prancing on shuttle 1 black band x 40    Evan received hot pack to B calf x 00 minutes following dry needling.      PATIENT EDUCATION AND HOME EXERCISES     Home Exercises Provided and Patient Education Provided "     Education provided:   - Continuance of HEP    Written Home Exercises Provided: Patient instructed to cont prior HEP. Exercises were reviewed and Evan was able to demonstrate them prior to the end of the session.  Evan demonstrated good  understanding of the education provided. See EMR under Patient Instructions for exercises provided during therapy sessions    ASSESSMENT   Combined electrical stimulation with dry needling today. Improved R foot pain reported by patient.    Evan Is progressing well towards her goals.   Pt prognosis is Good.     Pt will continue to benefit from skilled outpatient physical therapy to address the deficits listed in the problem list box on initial evaluation, provide pt/family education and to maximize pt's level of independence in the home and community environment.     Pt's spiritual, cultural and educational needs considered and pt agreeable to plan of care and goals.     Anticipated barriers to physical therapy: None       Previous Short Term Goals Status:     Short Term Goals: 4 weeks   Independent with HEP. (Ongoing)  Report decreased B foot pain < or =  5/10 with adls such as walking, standing, sit to stand from the floor, and running. (met)  Increased MMT for B LE by 1 muscle grade to promote proper pelvic stability to decrease B foot pain < or =  5/10 with adls such as walking, standing, sit to stand from the floor, and running.  (progressing, not met for running)  Increased B ankle dorsiflexion to 5 deg. (progressing, not met)     Long Term Goals: 8 weeks   Increased B ankle dorsiflexion to 10 deg (progressing, not met)  Report decreased B foot pain < or =  2/10 with adls such as walking, standing, sit to stand from the floor, and running. (progressing, not met)  Increased MMT for B LE by 1 muscle grade to promote proper pelvic stability to decrease B foot pain < or =  2/10 with adls such as walking, standing, sit to stand from the floor, and running.  (progressing,  not met)  Increased flexibility in B hip flexors and B IT bands to promote proper pelvic stability to decrease B foot pain < or =  2/10 with adls such as walking, standing, sit to stand from the floor, and running. (progressing, not met)  Patient to achieve CJ (at least 20% < 40% impaired, limited or restricted) level on the FOTO Outcomes Measurement System. (met)     PLAN   Updated Certification Period: 5/29/2023 to 7/24/2023    Improve B LE strength and increase dorsiflexion     Recommended Treatment Plan: 1 times per week for 8 weeks: Gait Training, Manual Therapy, Moist Heat/ Ice, Neuromuscular Re-ed, Patient Education, Therapeutic Activities, Therapeutic Exercise, and Dry Needling      Julian Galvez, PT

## 2023-07-07 NOTE — PROGRESS NOTES
"[facility]   Physical Therapy Treatment Note     Name: Evan Sousa  Clinic Number: [id number]    Therapy Diagnosis:   Encounter Diagnosis   Name Primary?    Decreased range of motion of both ankles Yes       Physician: Geraldo Tian DPM    Visit Date: 7/10/2023    Physician Orders: PT Eval and Treat   Medical Diagnosis:   M72.2 (ICD-10-CM) - Plantar fasciitis  M79.671,M79.672 (ICD-10-CM) - Foot pain, bilateral  M24.573 (ICD-10-CM) - Equinus contracture of ankle  Evaluation Date: 3/28/2023  Authorization Period Expiration: 3/22/2024  Plan of Care Certification Period: 5/29/2023 to 7/24/2023  Visit # / Visits authorized: 12 (11/ 20)  FOTO: 1/ 5 (Last issued on 6/26/2023)    PTA Visit #: 1/ 5    Precautions: Standard    Time In: 9:17 am   Time Out: 10:00 am   Total Billable Time:  43 minutes    SUBJECTIVE   Pt reports: Started running a little, doing 1 minute of walking and 30 seconds of running. Had some discomfort afterwards, but rolling a tennis ball under feet helped with pain    She was compliant with home exercise program.  Response to previous treatment: "It was fine"  Functional change: able to run for short periods of time     Pain: 2/10    Location: B heels    OBJECTIVE     Objective measures updated at progress report unless otherwise noted.      TREATMENT     Evan performed the bolded treatments listed below:      Patient received therapeutic exercises for 12 minutes for improved strength and AROM including:    Toe spreads x 30 reps  Towel scrunches x 30 reps  Toe yoga x 30 reps  Soleus stretch on incline x 90"  Gastroc stretch on incline x 90"    DL heel raise to SL eccentric lowering x 15 on step    Heel raises on 1/2 foam roll 3 x 10 reps  Hallux abduction off small black 1/2 foam roll x 20 reps  Hallux flexion with red theraband x 20 reps      Patient received manual therapeutic technique for 00 minutes for improved soft tissue and joint mobility including:  IASTM " "scrapping B achilles and plantar fascia  AP Talocrural mob I-III  Talocrural distraction    Application of TDN: Pt educated on benefits and potential side effects of dry needling. Educated pt on benefits, precautions, side effects following TDN. Educated pt to use heat following treatment sessions if pt is experiencing pain or soreness. Pt verbalized good understanding of education.  Pt signed written consent to dry needling. Pt gave verbal consent for DN     Pt received dry needling to the below listed muscles using 15 mm, 30 mm, 40 mm, and 50 mm needles.  B medial and lateral Gastroc  B soleus  B Achilles tendon (proximal and lateral)  B MCT  B quadratus plantae  B adductor hallucis  B abductor hallucis     Winding performed every 5 minutes during treatment.       Patient received neuromuscular reeducation for 15 minutes for improved proprioception and balance including:  NBOS, eyes opened, on foam 1 x 30"  NBOS, eyes closed, on foam 3 x 30"  Tandem on foam 3 x 30"   SLS on foam 2 x 30"   Hesitation marching 4 x 10 yds each with 15#KB  Lateral stepping 2x 30 yds with 10#KB  Ankle flips 4 x 10 yds each with 15# kettle bell  SL red ball toss on rebounder on foam forward/lateral x 15 B  Tandem walk on foam x 4 laps  +Tandem walk 20ft x 2      Patient received therapeutic activities for 16 minutes for improved tolerance to functional activities including:  Prancing on shuttle 1 black band x 40  +Shuttle DL jumps 1 black band  x 20  +Runnin/4 speed  15ft x 6   1/2 speed  15ft x 6    Evan received hot pack to B calf x 00 minutes following dry needling.      PATIENT EDUCATION AND HOME EXERCISES     Home Exercises Provided and Patient Education Provided     Education provided:   - Continuance of HEP    Written Home Exercises Provided: Patient instructed to cont prior HEP. Exercises were reviewed and Evan was able to demonstrate them prior to the end of the session.  Evan demonstrated good  understanding of the " education provided. See EMR under Patient Instructions for exercises provided during therapy sessions    ASSESSMENT   Pt tolerated today's treatment well with reported ankle fatigue throughout session. Initiated running and adde foam to rebounder activities with no complaint of pain. Pt potentially planning to discharge after last scheduled visit this month.    Evan Is progressing well towards her goals.   Pt prognosis is Good.     Pt will continue to benefit from skilled outpatient physical therapy to address the deficits listed in the problem list box on initial evaluation, provide pt/family education and to maximize pt's level of independence in the home and community environment.     Pt's spiritual, cultural and educational needs considered and pt agreeable to plan of care and goals.     Anticipated barriers to physical therapy: None       Previous Short Term Goals Status:     Short Term Goals: 4 weeks   Independent with HEP. (Ongoing)  Report decreased B foot pain < or =  5/10 with adls such as walking, standing, sit to stand from the floor, and running. (met)  Increased MMT for B LE by 1 muscle grade to promote proper pelvic stability to decrease B foot pain < or =  5/10 with adls such as walking, standing, sit to stand from the floor, and running.  (progressing, not met for running)  Increased B ankle dorsiflexion to 5 deg. (progressing, not met)     Long Term Goals: 8 weeks   Increased B ankle dorsiflexion to 10 deg (progressing, not met)  Report decreased B foot pain < or =  2/10 with adls such as walking, standing, sit to stand from the floor, and running. (progressing, not met)  Increased MMT for B LE by 1 muscle grade to promote proper pelvic stability to decrease B foot pain < or =  2/10 with adls such as walking, standing, sit to stand from the floor, and running.  (progressing, not met)  Increased flexibility in B hip flexors and B IT bands to promote proper pelvic stability to decrease B foot pain  < or =  2/10 with adls such as walking, standing, sit to stand from the floor, and running. (progressing, not met)  Patient to achieve CJ (at least 20% < 40% impaired, limited or restricted) level on the FOTO Outcomes Measurement System. (met)     PLAN   Updated Certification Period: 5/29/2023 to 7/24/2023    Improve B LE strength and increase dorsiflexion     Recommended Treatment Plan: 1 times per week for 8 weeks: Gait Training, Manual Therapy, Moist Heat/ Ice, Neuromuscular Re-ed, Patient Education, Therapeutic Activities, Therapeutic Exercise, and Dry Needling      Jose Davis III, PTA

## 2023-07-10 ENCOUNTER — CLINICAL SUPPORT (OUTPATIENT)
Dept: REHABILITATION | Facility: OTHER | Age: 28
End: 2023-07-10
Payer: COMMERCIAL

## 2023-07-10 DIAGNOSIS — M25.672 DECREASED RANGE OF MOTION OF BOTH ANKLES: Primary | ICD-10-CM

## 2023-07-10 DIAGNOSIS — M25.671 DECREASED RANGE OF MOTION OF BOTH ANKLES: Primary | ICD-10-CM

## 2023-07-10 PROCEDURE — 97530 THERAPEUTIC ACTIVITIES: CPT | Mod: PN,CQ

## 2023-07-10 PROCEDURE — 97110 THERAPEUTIC EXERCISES: CPT | Mod: PN,CQ

## 2023-07-10 PROCEDURE — 97112 NEUROMUSCULAR REEDUCATION: CPT | Mod: PN,CQ

## 2023-07-17 ENCOUNTER — CLINICAL SUPPORT (OUTPATIENT)
Dept: REHABILITATION | Facility: OTHER | Age: 28
End: 2023-07-17
Payer: COMMERCIAL

## 2023-07-17 DIAGNOSIS — M25.672 DECREASED RANGE OF MOTION OF BOTH ANKLES: Primary | ICD-10-CM

## 2023-07-17 DIAGNOSIS — M25.671 DECREASED RANGE OF MOTION OF BOTH ANKLES: Primary | ICD-10-CM

## 2023-07-17 PROCEDURE — 97110 THERAPEUTIC EXERCISES: CPT | Mod: PN

## 2023-07-17 NOTE — PROGRESS NOTES
"[facility]  Physical Therapy Discharge Note    Name: Evan Sousa  Clinic Number: [id number]    Therapy Diagnosis:   Encounter Diagnosis   Name Primary?    Decreased range of motion of both ankles Yes     Physician: Geraldo Tian DPM    Physician Orders: PT Eval and Treat   Medical Diagnosis:   M72.2 (ICD-10-CM) - Plantar fasciitis  M79.671,M79.672 (ICD-10-CM) - Foot pain, bilateral  M24.573 (ICD-10-CM) - Equinus contracture of ankle  Evaluation Date: 3/28/2023      Date of Last visit: 7/17/2023  Total Visits Received: 13    Precautions: Standard    Time In: 10:45 am   Time Out: 11:10 am   Total Billable Time:  17 minutes    SUBJECTIVE   Pt reports: she is feeling better. States she is doing good with running. Has been running and then walking on the treadmill. Did that yesterday and felt fine afterwards. "I felt normal."    She was compliant with home exercise program.  Response to previous treatment: no adverse effects  Functional change: able to run      Pain: 1/10    Location: B heels    OBJECTIVE     Objective measures updated at progress report unless otherwise noted.      CMS Impairment/Limitation/Restriction for FOTO Foot Survey    Therapist reviewed FOTO scores for Evan Sousa on 7/17/2023.   FOTO documents entered into [employer] - see Media section.    Limitation Score: 28%  Category: Mobility    Goal: CJ = at least 20% but < 40% impaired, limited or restricted  Discharge: CJ = at least 20% but < 40% impaired, limited or restricted          LE MMT  R  L    Hip flexion  5/5  5/5    Hip abduction  5/5  5/5    Hip extension  5/5  5/5    Hip ER  5/5  5/5    Hip IR  5/5  5/5    Knee extension  5/5  5/5    Knee flexion  5/5  5/5    Ankle dorsiflexion  5/5  5/5    Ankle plantar flexion  5/5  5/5    Ankle inversion  5/5  5/5    Ankle eversion  5/5  5/5          Flexibility testing:  - hamstrings:           B: WNL  - gastrocnemius:     B: WNL, R: 10 deg, L: 10 deg  - " piriformis:              B: WNL  - quadriceps:           B: WNL  - hip adductors:      B: WNL  - hip flexors:           B: WFL  - IT bands:             B: WFL     Joint mobility:                              R                     L  Ankle dorsiflexion                10 deg                 5 deg  Ankle plantar flexion            75 deg               70 deg  Ankle inversion                    55 deg               55  deg  Ankle eversion                     30 deg               30 deg      TREATMENT     Evan performed the bolded treatments listed below:      Patient received therapeutic exercises for 25 minutes for improved strength and AROM including:  Stationary bike x 8'  Reassessed strength, flexibility, and functional limitations    ASSESSMENT      Patient has progressed well in OP PT and achieved below listed goals. Will continue with run/walk on the treadmill. Discussed run/walk progress to running.    Discharge reason: Patient is now asymptomatic and Patient has met all of her goals    Discharge FOTO Score: 72 raw, 28% functional limitation    Goals:   Short Term Goals: 4 weeks   Independent with HEP. (met)  Report decreased B foot pain < or =  5/10 with adls such as walking, standing, sit to stand from the floor, and running. (met)  Increased MMT for B LE by 1 muscle grade to promote proper pelvic stability to decrease B foot pain < or =  5/10 with adls such as walking, standing, sit to stand from the floor, and running.  (progressing, not met for running)  Increased B ankle dorsiflexion to 5 deg. (progressing, not met)     Long Term Goals: 8 weeks   Increased B ankle dorsiflexion to 10 deg (met)  Report decreased B foot pain < or =  2/10 with adls such as walking, standing, sit to stand from the floor, and running. (met)  Increased MMT for B LE by 1 muscle grade to promote proper pelvic stability to decrease B foot pain < or =  2/10 with adls such as walking, standing, sit to stand from the floor, and  running.  (met)  Increased flexibility in B hip flexors and B IT bands to promote proper pelvic stability to decrease B foot pain < or =  2/10 with adls such as walking, standing, sit to stand from the floor, and running. (met)  Patient to achieve CJ (at least 20% < 40% impaired, limited or restricted) level on the FOTO Outcomes Measurement System. (met)      PLAN   This patient is discharged from Physical Therapy      Julian Galvez, PT

## 2023-09-18 ENCOUNTER — OFFICE VISIT (OUTPATIENT)
Dept: DERMATOLOGY | Facility: CLINIC | Age: 28
End: 2023-09-18
Payer: COMMERCIAL

## 2023-09-18 DIAGNOSIS — D23.9 DERMATOFIBROMA: ICD-10-CM

## 2023-09-18 DIAGNOSIS — R23.4 SKIN THICKENING: Primary | ICD-10-CM

## 2023-09-18 PROCEDURE — 1160F RVW MEDS BY RX/DR IN RCRD: CPT | Mod: CPTII,S$GLB,, | Performed by: STUDENT IN AN ORGANIZED HEALTH CARE EDUCATION/TRAINING PROGRAM

## 2023-09-18 PROCEDURE — 99999 PR PBB SHADOW E&M-EST. PATIENT-LVL III: CPT | Mod: PBBFAC,,, | Performed by: STUDENT IN AN ORGANIZED HEALTH CARE EDUCATION/TRAINING PROGRAM

## 2023-09-18 PROCEDURE — 17110 PR DESTRUCTION BENIGN LESIONS UP TO 14: ICD-10-PCS | Mod: S$GLB,,, | Performed by: STUDENT IN AN ORGANIZED HEALTH CARE EDUCATION/TRAINING PROGRAM

## 2023-09-18 PROCEDURE — 99202 PR OFFICE/OUTPT VISIT, NEW, LEVL II, 15-29 MIN: ICD-10-PCS | Mod: 25,S$GLB,, | Performed by: STUDENT IN AN ORGANIZED HEALTH CARE EDUCATION/TRAINING PROGRAM

## 2023-09-18 PROCEDURE — 99202 OFFICE O/P NEW SF 15 MIN: CPT | Mod: 25,S$GLB,, | Performed by: STUDENT IN AN ORGANIZED HEALTH CARE EDUCATION/TRAINING PROGRAM

## 2023-09-18 PROCEDURE — 17110 DESTRUCTION B9 LES UP TO 14: CPT | Mod: S$GLB,,, | Performed by: STUDENT IN AN ORGANIZED HEALTH CARE EDUCATION/TRAINING PROGRAM

## 2023-09-18 PROCEDURE — 1160F PR REVIEW ALL MEDS BY PRESCRIBER/CLIN PHARMACIST DOCUMENTED: ICD-10-PCS | Mod: CPTII,S$GLB,, | Performed by: STUDENT IN AN ORGANIZED HEALTH CARE EDUCATION/TRAINING PROGRAM

## 2023-09-18 PROCEDURE — 1159F PR MEDICATION LIST DOCUMENTED IN MEDICAL RECORD: ICD-10-PCS | Mod: CPTII,S$GLB,, | Performed by: STUDENT IN AN ORGANIZED HEALTH CARE EDUCATION/TRAINING PROGRAM

## 2023-09-18 PROCEDURE — 99999 PR PBB SHADOW E&M-EST. PATIENT-LVL III: ICD-10-PCS | Mod: PBBFAC,,, | Performed by: STUDENT IN AN ORGANIZED HEALTH CARE EDUCATION/TRAINING PROGRAM

## 2023-09-18 PROCEDURE — 1159F MED LIST DOCD IN RCRD: CPT | Mod: CPTII,S$GLB,, | Performed by: STUDENT IN AN ORGANIZED HEALTH CARE EDUCATION/TRAINING PROGRAM

## 2023-09-18 NOTE — PATIENT INSTRUCTIONS

## 2023-09-18 NOTE — PROGRESS NOTES
Subjective:      Patient ID:  Evan Sousa is a 28 y.o. female who presents for   Chief Complaint   Patient presents with    Lesion     Left arm, left anterior thigh     Pt is here today with c/o lesions on left arm and left anterior thigh. Present for several months, no symptoms.      Review of Systems   Skin:  Positive for daily sunscreen use and activity-related sunscreen use.       Objective:   Physical Exam   Constitutional: She appears well-developed and well-nourished. No distress.   Neurological: She is alert and oriented to person, place, and time. She is not disoriented.   Psychiatric: She has a normal mood and affect.   Skin:   Areas Examined (abnormalities noted in diagram):   LUE Inspection Performed  LLE Inspection Performed            Diagram Legend     Erythematous scaling macule/papule c/w actinic keratosis       Vascular papule c/w angioma      Pigmented verrucoid papule/plaque c/w seborrheic keratosis      Yellow umbilicated papule c/w sebaceous hyperplasia      Irregularly shaped tan macule c/w lentigo     1-2 mm smooth white papules consistent with Milia      Movable subcutaneous cyst with punctum c/w epidermal inclusion cyst      Subcutaneous movable cyst c/w pilar cyst      Firm pink to brown papule c/w dermatofibroma      Pedunculated fleshy papule(s) c/w skin tag(s)      Evenly pigmented macule c/w junctional nevus     Mildly variegated pigmented, slightly irregular-bordered macule c/w mildly atypical nevus      Flesh colored to evenly pigmented papule c/w intradermal nevus       Pink pearly papule/plaque c/w basal cell carcinoma      Erythematous hyperkeratotic cursted plaque c/w SCC      Surgical scar with no sign of skin cancer recurrence      Open and closed comedones      Inflammatory papules and pustules      Verrucoid papule consistent consistent with wart     Erythematous eczematous patches and plaques     Dystrophic onycholytic nail with subungual debris c/w onychomycosis      Umbilicated papule    Erythematous-base heme-crusted tan verrucoid plaque consistent with inflamed seborrheic keratosis     Erythematous Silvery Scaling Plaque c/w Psoriasis     See annotation      Assessment / Plan:        Skin thickening  Dermatofibroma  -     Ambulatory referral/consult to Dermatology    Reassurance given to patient. No treatment is necessary.     The lesion on her leg was raised and irritated. Discussed treatment options. Patient opted to proceed with LN2    Cryosurgery procedure note:  Verbal consent from the patient is obtained including, but not limited to, risk of hypopigmentation/hyperpigmentation, scar, recurrence of lesion. Liquid nitrogen cryosurgery is applied to 1 lesions to produce a freeze injury. The patient is aware that blisters may form and is instructed on wound care with gentle cleansing and use of vaseline ointment to keep moist until healed. The patient is supplied a handout on cryosurgery and is instructed to call if lesions do not completely resolve.       Follow up if symptoms worsen or fail to improve.

## 2024-03-04 ENCOUNTER — OFFICE VISIT (OUTPATIENT)
Dept: OBSTETRICS AND GYNECOLOGY | Facility: CLINIC | Age: 29
End: 2024-03-04
Payer: COMMERCIAL

## 2024-03-04 VITALS
DIASTOLIC BLOOD PRESSURE: 80 MMHG | BODY MASS INDEX: 29.09 KG/M2 | SYSTOLIC BLOOD PRESSURE: 136 MMHG | HEIGHT: 66 IN | WEIGHT: 181 LBS

## 2024-03-04 DIAGNOSIS — Z01.419 ENCOUNTER FOR ANNUAL ROUTINE GYNECOLOGICAL EXAMINATION: Primary | ICD-10-CM

## 2024-03-04 PROCEDURE — 1160F RVW MEDS BY RX/DR IN RCRD: CPT | Mod: CPTII,S$GLB,, | Performed by: OBSTETRICS & GYNECOLOGY

## 2024-03-04 PROCEDURE — 87624 HPV HI-RISK TYP POOLED RSLT: CPT | Performed by: OBSTETRICS & GYNECOLOGY

## 2024-03-04 PROCEDURE — 99459 PELVIC EXAMINATION: CPT | Mod: S$GLB,,, | Performed by: OBSTETRICS & GYNECOLOGY

## 2024-03-04 PROCEDURE — 3008F BODY MASS INDEX DOCD: CPT | Mod: CPTII,S$GLB,, | Performed by: OBSTETRICS & GYNECOLOGY

## 2024-03-04 PROCEDURE — 88175 CYTOPATH C/V AUTO FLUID REDO: CPT | Performed by: OBSTETRICS & GYNECOLOGY

## 2024-03-04 PROCEDURE — 3079F DIAST BP 80-89 MM HG: CPT | Mod: CPTII,S$GLB,, | Performed by: OBSTETRICS & GYNECOLOGY

## 2024-03-04 PROCEDURE — 1159F MED LIST DOCD IN RCRD: CPT | Mod: CPTII,S$GLB,, | Performed by: OBSTETRICS & GYNECOLOGY

## 2024-03-04 PROCEDURE — 3075F SYST BP GE 130 - 139MM HG: CPT | Mod: CPTII,S$GLB,, | Performed by: OBSTETRICS & GYNECOLOGY

## 2024-03-04 PROCEDURE — 99999 PR PBB SHADOW E&M-EST. PATIENT-LVL III: CPT | Mod: PBBFAC,,, | Performed by: OBSTETRICS & GYNECOLOGY

## 2024-03-04 PROCEDURE — 99395 PREV VISIT EST AGE 18-39: CPT | Mod: S$GLB,,, | Performed by: OBSTETRICS & GYNECOLOGY

## 2024-03-04 RX ORDER — LEVONORGESTREL / ETHINYL ESTRADIOL AND ETHINYL ESTRADIOL 150-30(84)
1 KIT ORAL DAILY
Qty: 90 EACH | Refills: 3 | Status: SHIPPED | OUTPATIENT
Start: 2024-03-04 | End: 2025-03-04

## 2024-03-04 NOTE — PROGRESS NOTES
SUBJECTIVE:     Chief Complaint: Well Woman       History of Present Illness:  Annual Exam  Patient presents for annual exam.   She c/o nothing today.  LMP: 23  She denies any vd, vb, dyspareunia, dysuria, depression, anxiety.  Last pap was in  and was neg. HPV na.  Birth Control: seasonale  declines STD testing.     GYN screening history: denies  Mammogram history: na  Colonoscopy history: na  Dexa history: na     FH:   Breast cancer: maternal GM  Colon cancer: none  Ovarian cancer: none    Review of patient's allergies indicates:  No Known Allergies    Past Medical History:   Diagnosis Date    COVID-19     2020     History reviewed. No pertinent surgical history.  OB History          0    Para   0    Term   0       0    AB   0    Living   0         SAB   0    IAB   0    Ectopic   0    Multiple   0    Live Births                   Family History   Problem Relation Age of Onset    Hypertension Mother     Breast cancer Maternal Grandmother 70        Dec'd    No Known Problems Maternal Grandfather     Diabetes Paternal Grandfather     Stroke Paternal Grandfather     Ovarian cancer Neg Hx     Colon cancer Neg Hx      Social History     Tobacco Use    Smoking status: Never    Smokeless tobacco: Never   Substance Use Topics    Alcohol use: Yes     Alcohol/week: 0.0 standard drinks of alcohol     Comment: socially    Drug use: No       Current Outpatient Medications   Medication Sig    L norgest/e.estradioL-e.estrad (SEASONIQUE) 0.15 mg-30 mcg (84)/10 mcg (7) 3MPk Take 1 tablet by mouth once daily.    levonorgestrel-ethinyl estradiol (SEASONALE) 0.15 mg-30 mcg (91) per tablet Take 1 tablet by mouth once daily.    meloxicam (MOBIC) 15 MG tablet Take 1 tablet (15 mg total) by mouth once daily.     No current facility-administered medications for this visit.       Review of Systems:  GENERAL: No fever, chills, fatigability or weight loss.  CARDIOVASCULAR: No chest pain. No  palpitations.  RESPIRATORY: No SOB, no wheezing.  BREAST: Denies pain. No lumps. No discharge.  VULVAR: No pain, no lesions and no itching.  VAGINAL: No relaxation, no itching, no discharge, no abnormal bleeding and no lesions.  ABDOMEN: No abdominal pain. Denies nausea. Denies vomiting. No diarrhea. No constipation  URINARY: No incontinence, no nocturia, no frequency and no dysuria.  NEUROLOGICAL: No headaches. No vision changes.       OBJECTIVE:     Vitals:    03/04/24 0832   BP: 136/80       Patient verbally consents to pelvic and breast exams. Female chaperone present for exams.   Physical Exam:  Gen: NAD, well developed, well-nourished  HEENT: Normocephalic, atraumatic  Eyes: EOM nl, conjuntivae normal  Neck: ROM normal, no thyromegaly  Respiratory: Effort normal   Abd: soft, nontender, no masses palpated  Breast: Normal bilaterally, no masses, lesions or tenderness. No nipple discharge on expression, no lymphadenopathy bilaterally.  SSE:  Vulva: no lesions or rashes  Cervix: No lesions noted, nonfriable, no vaginal discharge some vaginal bleeding noted  BME:   Cervix: No CMT  Adnexa: nl bilaterally, no masses or fullness palpated  Uterus: normal, nonenlarged  Musculoskeletal: normal ROM  Neuro: alert, AAOx3  Skin: warm and dry  Psych: mood/affect nl, behavior normal, judgement normal, thought content normal        ASSESSMENT:       ICD-10-CM ICD-9-CM    1. Encounter for annual routine gynecological examination  Z01.419 V72.31 Liquid-Based Pap Smear, Screening      HPV High Risk Genotypes, PCR             Plan:      Evan was seen today for well woman.    Diagnoses and all orders for this visit:    Encounter for annual routine gynecological examination  -     Liquid-Based Pap Smear, Screening  -     HPV High Risk Genotypes, PCR    Other orders  -     L norgest/e.estradioL-e.estrad (SEASONIQUE) 0.15 mg-30 mcg (84)/10 mcg (7) 3MPk; Take 1 tablet by mouth once daily.        Orders Placed This Encounter    Procedures    HPV High Risk Genotypes, PCR       Follow up in one year for annual, or prn.    Julie R Jeansonne

## 2024-03-08 LAB
FINAL PATHOLOGIC DIAGNOSIS: NORMAL
Lab: NORMAL

## 2024-03-12 LAB
HPV HR 12 DNA SPEC QL NAA+PROBE: NEGATIVE
HPV16 AG SPEC QL: NEGATIVE
HPV18 DNA SPEC QL NAA+PROBE: NEGATIVE

## 2024-06-10 ENCOUNTER — PATIENT MESSAGE (OUTPATIENT)
Dept: INTERNAL MEDICINE | Facility: CLINIC | Age: 29
End: 2024-06-10
Payer: COMMERCIAL

## 2024-12-10 ENCOUNTER — OFFICE VISIT (OUTPATIENT)
Dept: INTERNAL MEDICINE | Facility: CLINIC | Age: 29
End: 2024-12-10
Payer: COMMERCIAL

## 2024-12-10 VITALS
HEART RATE: 84 BPM | OXYGEN SATURATION: 99 % | DIASTOLIC BLOOD PRESSURE: 70 MMHG | SYSTOLIC BLOOD PRESSURE: 120 MMHG | WEIGHT: 187.19 LBS | HEIGHT: 66 IN | BODY MASS INDEX: 30.08 KG/M2

## 2024-12-10 DIAGNOSIS — R10.2 PELVIC PAIN: Primary | ICD-10-CM

## 2024-12-10 LAB
B-HCG UR QL: NEGATIVE
BILIRUB SERPL-MCNC: NEGATIVE MG/DL
BLOOD URINE, POC: NEGATIVE
CLARITY, POC UA: NORMAL
COLOR, POC UA: NORMAL
CTP QC/QA: YES
GLUCOSE UR QL STRIP: NEGATIVE
KETONES UR QL STRIP: NEGATIVE
LEUKOCYTE ESTERASE URINE, POC: NEGATIVE
NITRITE, POC UA: 0.2
PH, POC UA: 6.5
PROTEIN, POC: NEGATIVE
SPECIFIC GRAVITY, POC UA: 1.03
UROBILINOGEN, POC UA: NEGATIVE

## 2024-12-10 PROCEDURE — 99213 OFFICE O/P EST LOW 20 MIN: CPT | Mod: S$GLB,,, | Performed by: NURSE PRACTITIONER

## 2024-12-10 PROCEDURE — 3008F BODY MASS INDEX DOCD: CPT | Mod: CPTII,S$GLB,, | Performed by: NURSE PRACTITIONER

## 2024-12-10 PROCEDURE — 3078F DIAST BP <80 MM HG: CPT | Mod: CPTII,S$GLB,, | Performed by: NURSE PRACTITIONER

## 2024-12-10 PROCEDURE — 99999 PR PBB SHADOW E&M-EST. PATIENT-LVL III: CPT | Mod: PBBFAC,,, | Performed by: NURSE PRACTITIONER

## 2024-12-10 PROCEDURE — 81002 URINALYSIS NONAUTO W/O SCOPE: CPT | Mod: S$GLB,,, | Performed by: NURSE PRACTITIONER

## 2024-12-10 PROCEDURE — 81025 URINE PREGNANCY TEST: CPT | Mod: S$GLB,,, | Performed by: NURSE PRACTITIONER

## 2024-12-10 PROCEDURE — 3074F SYST BP LT 130 MM HG: CPT | Mod: CPTII,S$GLB,, | Performed by: NURSE PRACTITIONER

## 2024-12-10 RX ORDER — FLUOXETINE HYDROCHLORIDE 20 MG/1
20 CAPSULE ORAL DAILY
COMMUNITY
Start: 2024-09-01

## 2024-12-10 NOTE — PROGRESS NOTES
INTERNAL MEDICINE PROGRESS/URGENT CARE NOTE    CHIEF COMPLAINT     Chief Complaint   Patient presents with    Abdominal Pain       HPI     Evan Sousa is a 29 y.o. female who presents for an urgent/follow up visit today.    History of Present Illness    CHIEF COMPLAINT:  Evan presents today for right-sided pelvic pain    ABDOMINAL PAIN:  She reports experiencing intermittent right-sided abdominal pain that became severe this morning. Had an episode of this last month and resolved. Intensity not as bad. Lying in bed provided some relief. The pain is distinct from her recent menstrual cramps. She denies any associated vomiting, nausea, fevers, vaginal discharge, or UTI symptoms. No worsening with food or hydration. Nothing makes it worse or better.     GASTROINTESTINAL SYMPTOMS:  She experienced diarrhea the night before the visit, noting that she occasionally has diarrhea related to her diet. She denies blood in stool.    MENSTRUAL HISTORY:  She had her menstrual period last week and notes light spotting this morning, describing it as the 'faintest pink' when she got up.    MEDICATIONS:  She takes birth control and Prozac 20 mg daily for anxiety management.          Patient Active Problem List   Diagnosis    Near syncope    Reactive hypoglycemia    Mixed hyperlipidemia    Plantar fasciitis    Decreased range of motion of both ankles    Foot pain, bilateral    Equinus contracture of ankle        Past Medical History:  Past Medical History:   Diagnosis Date    COVID-19     2020        Past Surgical History:  No past surgical history on file.     Allergies:  Review of patient's allergies indicates:  No Known Allergies    Home Medications:    Current Outpatient Medications:     FLUoxetine 20 MG capsule, Take 20 mg by mouth once daily., Disp: , Rfl:     L norgest/e.estradioL-e.estrad (SEASONIQUE) 0.15 mg-30 mcg (84)/10 mcg (7) 3MPk, Take 1 tablet by mouth once daily., Disp: 90 each, Rfl: 3     "levonorgestrel-ethinyl estradiol (SEASONALE) 0.15 mg-30 mcg (91) per tablet, Take 1 tablet by mouth once daily., Disp: 90 tablet, Rfl: 3     Review of Systems:  Review of Systems   Constitutional:  Negative for fever.   HENT:  Negative for congestion and sore throat.    Respiratory:  Negative for cough and shortness of breath.    Cardiovascular:  Negative for chest pain.   Gastrointestinal:  Positive for diarrhea. Negative for blood in stool, constipation, nausea and vomiting.   Genitourinary:  Positive for pelvic pain. Negative for decreased urine volume, difficulty urinating, dysuria, flank pain, frequency, urgency and vaginal pain.   Neurological:  Negative for weakness and headaches.         PHYSICAL EXAM     Vitals:    12/10/24 1532   BP: 120/70   BP Location: Right arm   Patient Position: Sitting   Pulse: 84   SpO2: 99%   Weight: 84.9 kg (187 lb 2.7 oz)   Height: 5' 6" (1.676 m)      Body mass index is 30.21 kg/m².     Physical Exam  Vitals reviewed.   Constitutional:       Appearance: Normal appearance.   HENT:      Head: Normocephalic.      Mouth/Throat:      Mouth: Mucous membranes are moist.      Pharynx: Oropharynx is clear.   Eyes:      Conjunctiva/sclera: Conjunctivae normal.      Pupils: Pupils are equal, round, and reactive to light.   Cardiovascular:      Rate and Rhythm: Normal rate and regular rhythm.      Heart sounds: Normal heart sounds.   Pulmonary:      Effort: Pulmonary effort is normal.      Breath sounds: Normal breath sounds.   Abdominal:      General: Bowel sounds are normal.      Palpations: Abdomen is soft.      Hernia: No hernia is present.          Comments: Ttp. No rebound or guarding.   Skin:     General: Skin is warm and dry.   Neurological:      General: No focal deficit present.      Mental Status: She is alert.   Psychiatric:         Mood and Affect: Mood normal.         Behavior: Behavior normal.         LABS     Lab Results   Component Value Date    HGBA1C 4.9 06/09/2022 "     CMP  Sodium   Date Value Ref Range Status   06/09/2022 141 136 - 145 mmol/L Final     Potassium   Date Value Ref Range Status   06/09/2022 4.2 3.5 - 5.1 mmol/L Final     Chloride   Date Value Ref Range Status   06/09/2022 108 95 - 110 mmol/L Final     CO2   Date Value Ref Range Status   06/09/2022 20 (L) 23 - 29 mmol/L Final     Glucose   Date Value Ref Range Status   06/09/2022 97 70 - 110 mg/dL Final     BUN   Date Value Ref Range Status   06/09/2022 11 6 - 20 mg/dL Final     Creatinine   Date Value Ref Range Status   06/09/2022 0.8 0.5 - 1.4 mg/dL Final     Calcium   Date Value Ref Range Status   06/09/2022 9.8 8.7 - 10.5 mg/dL Final     Total Protein   Date Value Ref Range Status   06/09/2022 7.5 6.0 - 8.4 g/dL Final     Albumin   Date Value Ref Range Status   06/09/2022 4.0 3.5 - 5.2 g/dL Final     Total Bilirubin   Date Value Ref Range Status   06/09/2022 0.5 0.1 - 1.0 mg/dL Final     Comment:     For infants and newborns, interpretation of results should be based  on gestational age, weight and in agreement with clinical  observations.    Premature Infant recommended reference ranges:  Up to 24 hours.............<8.0 mg/dL  Up to 48 hours............<12.0 mg/dL  3-5 days..................<15.0 mg/dL  6-29 days.................<15.0 mg/dL       Alkaline Phosphatase   Date Value Ref Range Status   06/09/2022 53 (L) 55 - 135 U/L Final     AST   Date Value Ref Range Status   06/09/2022 18 10 - 40 U/L Final     ALT   Date Value Ref Range Status   06/09/2022 19 10 - 44 U/L Final     Anion Gap   Date Value Ref Range Status   06/09/2022 13 8 - 16 mmol/L Final     eGFR if    Date Value Ref Range Status   06/09/2022 >60.0 >60 mL/min/1.73 m^2 Final     eGFR if non    Date Value Ref Range Status   06/09/2022 >60.0 >60 mL/min/1.73 m^2 Final     Comment:     Calculation used to obtain the estimated glomerular filtration  rate (eGFR) is the CKD-EPI equation.        Lab Results   Component  Value Date    WBC 8.90 06/09/2022    HGB 15.2 06/09/2022    HCT 47.0 06/09/2022    MCV 90 06/09/2022     06/09/2022     Lab Results   Component Value Date    CHOL 206 (H) 05/02/2023    CHOL 218 (H) 12/06/2022    CHOL 281 (H) 06/09/2022     Lab Results   Component Value Date    HDL 60 05/02/2023    HDL 61 12/06/2022    HDL 68 06/09/2022     Lab Results   Component Value Date    LDLCALC 121.4 05/02/2023    LDLCALC 137.0 12/06/2022    LDLCALC 184.0 (H) 06/09/2022     Lab Results   Component Value Date    TRIG 123 05/02/2023    TRIG 100 12/06/2022    TRIG 145 06/09/2022     Lab Results   Component Value Date    CHOLHDL 29.1 05/02/2023    CHOLHDL 28.0 12/06/2022    CHOLHDL 24.2 06/09/2022     Lab Results   Component Value Date    TSH 0.650 06/09/2022       ASSESSMENT     1. Pelvic pain           PLAN      Check UA and upt and will US pelvis.  Urine is clear. UPT negative. If any other symptoms arise that may point us in a different direction she will let know.   Can try taking naproxen or IBU otc to see if it helps in event this is a cyst.       1. Pelvic pain  - POCT urine dipstick without microscope  - POCT urine pregnancy  - US Transvaginal Non OB; Future       Follow up with PCP     Patient's plan/treatment was discussed including medications and possible side effects. Verbalized understanding of all instructions.     This note was generated with the assistance of ambient listening technology. Verbal consent was obtained by the patient and accompanying visitor(s) for the recording of patient appointment to facilitate this note. I attest to having reviewed and edited the generated note for accuracy, though some syntax or spelling errors may persist. Please contact the author of this note for any clarification.           HODA Mtz  Department of Internal Medicine - [facility]  12/10/2024

## 2024-12-11 ENCOUNTER — HOSPITAL ENCOUNTER (OUTPATIENT)
Dept: RADIOLOGY | Facility: OTHER | Age: 29
Discharge: HOME | End: 2024-12-11
Attending: NURSE PRACTITIONER
Payer: COMMERCIAL

## 2024-12-11 DIAGNOSIS — R10.2 PELVIC PAIN: ICD-10-CM

## 2024-12-11 PROCEDURE — 76830 TRANSVAGINAL US NON-OB: CPT | Mod: TC

## 2024-12-11 PROCEDURE — 76830 TRANSVAGINAL US NON-OB: CPT | Mod: 26,,, | Performed by: RADIOLOGY

## 2024-12-13 ENCOUNTER — PATIENT MESSAGE (OUTPATIENT)
Dept: INTERNAL MEDICINE | Facility: CLINIC | Age: 29
End: 2024-12-13
Payer: COMMERCIAL

## 2025-03-01 RX ORDER — LEVONORGESTREL AND ETHINYL ESTRADIOL 150-30(84)
1 KIT ORAL
Qty: 91 TABLET | Refills: 0 | Status: SHIPPED | OUTPATIENT
Start: 2025-03-01

## 2025-03-01 NOTE — TELEPHONE ENCOUNTER
Refill Decision Note   Evan Lars  is requesting a refill authorization.  Brief Assessment and Rationale for Refill:  Approve     Medication Therapy Plan:       Medication Reconciliation Completed: No   Comments:     No Care Gaps recommended.     Note composed:8:07 AM 03/01/2025

## 2025-05-15 ENCOUNTER — OFFICE VISIT (OUTPATIENT)
Dept: OBSTETRICS AND GYNECOLOGY | Facility: CLINIC | Age: 30
End: 2025-05-15
Payer: COMMERCIAL

## 2025-05-15 VITALS
SYSTOLIC BLOOD PRESSURE: 110 MMHG | DIASTOLIC BLOOD PRESSURE: 84 MMHG | HEIGHT: 66 IN | WEIGHT: 182.75 LBS | BODY MASS INDEX: 29.37 KG/M2

## 2025-05-15 DIAGNOSIS — Z01.419 ENCOUNTER FOR ANNUAL ROUTINE GYNECOLOGICAL EXAMINATION: Primary | ICD-10-CM

## 2025-05-15 PROCEDURE — 3079F DIAST BP 80-89 MM HG: CPT | Mod: CPTII,S$GLB,, | Performed by: OBSTETRICS & GYNECOLOGY

## 2025-05-15 PROCEDURE — 1160F RVW MEDS BY RX/DR IN RCRD: CPT | Mod: CPTII,S$GLB,, | Performed by: OBSTETRICS & GYNECOLOGY

## 2025-05-15 PROCEDURE — 99395 PREV VISIT EST AGE 18-39: CPT | Mod: S$GLB,,, | Performed by: OBSTETRICS & GYNECOLOGY

## 2025-05-15 PROCEDURE — 1159F MED LIST DOCD IN RCRD: CPT | Mod: CPTII,S$GLB,, | Performed by: OBSTETRICS & GYNECOLOGY

## 2025-05-15 PROCEDURE — 3074F SYST BP LT 130 MM HG: CPT | Mod: CPTII,S$GLB,, | Performed by: OBSTETRICS & GYNECOLOGY

## 2025-05-15 PROCEDURE — 99999 PR PBB SHADOW E&M-EST. PATIENT-LVL III: CPT | Mod: PBBFAC,,, | Performed by: OBSTETRICS & GYNECOLOGY

## 2025-05-15 PROCEDURE — 3008F BODY MASS INDEX DOCD: CPT | Mod: CPTII,S$GLB,, | Performed by: OBSTETRICS & GYNECOLOGY

## 2025-05-15 RX ORDER — BUPROPION HYDROCHLORIDE 150 MG/1
150 TABLET ORAL
COMMUNITY

## 2025-05-15 RX ORDER — LEVONORGESTREL AND ETHINYL ESTRADIOL 150-30(84)
1 KIT ORAL DAILY
Qty: 90 TABLET | Refills: 3 | Status: SHIPPED | OUTPATIENT
Start: 2025-05-15 | End: 2026-05-15

## 2025-05-15 RX ORDER — FLUOXETINE HYDROCHLORIDE 40 MG/1
40 CAPSULE ORAL DAILY
COMMUNITY
Start: 2025-02-14

## 2025-05-15 NOTE — PROGRESS NOTES
SUBJECTIVE:     Chief Complaint: Well Woman       History of Present Illness:  Annual Exam  Patient presents for annual exam.   She c/o nothing today.  LMP: q 3 months  She denies any vd, vb, dyspareunia, dysuria, depression, anxiety.  Last pap was in  and was neg. HPV neg.  Birth Control: Daysee, wants to continue  declines STD testing.   Buying house soon.     GYN screening history: denies  Mammogram history: na  Colonoscopy history: na  Dexa history: na     FH:   Breast cancer: maternal GM  Colon cancer: none  Ovarian cancer: none       Review of patient's allergies indicates:  No Known Allergies    Past Medical History:   Diagnosis Date    COV2020     History reviewed. No pertinent surgical history.  OB History          0    Para   0    Term   0       0    AB   0    Living   0         SAB   0    IAB   0    Ectopic   0    Multiple   0    Live Births                   Family History   Problem Relation Name Age of Onset    Hypertension Mother      Breast cancer Maternal Grandmother  70        Dec'd    No Known Problems Maternal Grandfather      Diabetes Paternal Grandfather      Stroke Paternal Grandfather      Ovarian cancer Neg Hx      Colon cancer Neg Hx       Social History[1]    Current Outpatient Medications   Medication Sig    buPROPion (WELLBUTRIN XL) 150 MG TB24 tablet Take 150 mg by mouth.    FLUoxetine 40 MG capsule Take 40 mg by mouth once daily.    DAYSEE 0.15 mg-30 mcg (84)/10 mcg (7) 3MPk Take 1 tablet by mouth once daily.     No current facility-administered medications for this visit.       Review of Systems:  GENERAL: No fever, chills, fatigability or weight loss.  CARDIOVASCULAR: No chest pain. No palpitations.  RESPIRATORY: No SOB, no wheezing.  BREAST: Denies pain. No lumps. No discharge.  VULVAR: No pain, no lesions and no itching.  VAGINAL: No relaxation, no itching, no discharge, no abnormal bleeding and no lesions.  ABDOMEN: No abdominal pain. Denies nausea.  Denies vomiting. No diarrhea. No constipation  URINARY: No incontinence, no nocturia, no frequency and no dysuria.  NEUROLOGICAL: No headaches. No vision changes.       OBJECTIVE:     Vitals:    05/15/25 0900   BP: 110/84       Patient verbally consents to pelvic and breast exams. Female chaperone present for exams.   Physical Exam:  Gen: NAD, well developed, well-nourished  HEENT: Normocephalic, atraumatic  Eyes: EOM nl, conjuntivae normal  Neck: ROM normal, no thyromegaly  Respiratory: Effort normal   Abd: soft, nontender, no masses palpated  Breast: Normal bilaterally, no masses, lesions or tenderness. No nipple discharge on expression, no lymphadenopathy bilaterally.  SSE:  Vulva: no lesions or rashes  Cervix: No lesions noted, nonfriable, no vaginal discharge or vaginal bleeding noted  BME:   Cervix: No CMT  Adnexa: nl bilaterally, no masses or fullness palpated  Uterus: normal, nonenlarged  Musculoskeletal: normal ROM  Neuro: alert, AAOx3  Skin: warm and dry  Psych: mood/affect nl, behavior normal, judgement normal, thought content normal        ASSESSMENT:       ICD-10-CM ICD-9-CM    1. Encounter for annual routine gynecological examination  Z01.419 V72.31              Plan:      Evan was seen today for well woman.    Diagnoses and all orders for this visit:    Encounter for annual routine gynecological examination    Other orders  -     DAYSEE 0.15 mg-30 mcg (84)/10 mcg (7) 3MPk; Take 1 tablet by mouth once daily.        No orders of the defined types were placed in this encounter.      Patient was counseled today on ACS guidelines and recommendations for yearly pelvic exams, mammograms and monthly self breast exams; to see her PCP for other health maintenance.      Follow up in one year for annual, or prn.    Julie R Jeansonne             [1]   Social History  Tobacco Use    Smoking status: Never    Smokeless tobacco: Never   Substance Use Topics    Alcohol use: Yes     Alcohol/week: 0.0 standard drinks  of alcohol     Comment: socially    Drug use: No

## 2025-08-24 ENCOUNTER — PATIENT MESSAGE (OUTPATIENT)
Dept: OBSTETRICS AND GYNECOLOGY | Facility: CLINIC | Age: 30
End: 2025-08-24
Payer: COMMERCIAL

## 2025-08-25 RX ORDER — LEVONORGESTREL / ETHINYL ESTRADIOL AND ETHINYL ESTRADIOL 150-30(84)
1 KIT ORAL DAILY
Qty: 84 TABLET | Refills: 3 | Status: SHIPPED | OUTPATIENT
Start: 2025-08-25 | End: 2026-08-25